# Patient Record
Sex: FEMALE | Race: BLACK OR AFRICAN AMERICAN | NOT HISPANIC OR LATINO | Employment: OTHER | URBAN - METROPOLITAN AREA
[De-identification: names, ages, dates, MRNs, and addresses within clinical notes are randomized per-mention and may not be internally consistent; named-entity substitution may affect disease eponyms.]

---

## 2023-03-30 PROBLEM — R00.0 TACHYCARDIA: Status: ACTIVE | Noted: 2023-03-30

## 2023-03-30 PROBLEM — R82.71 ASB (ASYMPTOMATIC BACTERIURIA): Status: ACTIVE | Noted: 2023-03-30

## 2023-03-30 PROBLEM — I34.0 MITRAL REGURGITATION: Status: ACTIVE | Noted: 2023-03-30

## 2023-03-30 PROBLEM — R11.2 NAUSEA WITH VOMITING: Status: ACTIVE | Noted: 2023-03-30

## 2023-03-30 PROBLEM — I42.9 CARDIOMYOPATHY (MULTI): Status: ACTIVE | Noted: 2023-03-30

## 2023-03-30 PROBLEM — I21.4 NON-ST ELEVATION MYOCARDIAL INFARCTION (NSTEMI) IN RECOVERY PHASE (MULTI): Status: ACTIVE | Noted: 2023-03-30

## 2023-03-30 PROBLEM — E04.2 MULTINODULAR NON-TOXIC GOITER: Status: ACTIVE | Noted: 2023-03-30

## 2023-03-30 PROBLEM — R35.0 FREQUENCY OF URINATION: Status: ACTIVE | Noted: 2023-03-30

## 2023-03-30 PROBLEM — N39.0 UTI (URINARY TRACT INFECTION): Status: ACTIVE | Noted: 2023-03-30

## 2023-03-30 PROBLEM — M79.89 LEG SWELLING: Status: ACTIVE | Noted: 2023-03-30

## 2023-03-30 PROBLEM — R29.898 RIGHT ARM WEAKNESS: Status: ACTIVE | Noted: 2023-03-30

## 2023-03-30 PROBLEM — M35.9 CONNECTIVE TISSUE DISORDER (MULTI): Status: ACTIVE | Noted: 2023-03-30

## 2023-03-30 PROBLEM — G43.909 MIGRAINES: Status: ACTIVE | Noted: 2023-03-30

## 2023-03-30 PROBLEM — J40 BRONCHITIS: Status: ACTIVE | Noted: 2023-03-30

## 2023-03-30 PROBLEM — D72.829 LEUKOCYTOSIS: Status: ACTIVE | Noted: 2023-03-30

## 2023-03-30 PROBLEM — I31.39 PERICARDIAL EFFUSION (HHS-HCC): Status: ACTIVE | Noted: 2023-03-30

## 2023-03-30 PROBLEM — K21.9 GERD (GASTROESOPHAGEAL REFLUX DISEASE): Status: ACTIVE | Noted: 2023-03-30

## 2023-03-30 PROBLEM — M50.90 CERVICAL DISC DISEASE: Status: ACTIVE | Noted: 2023-03-30

## 2023-03-30 PROBLEM — I72.2 RENAL ARTERY ANEURYSM (CMS-HCC): Status: ACTIVE | Noted: 2023-03-30

## 2023-03-30 PROBLEM — R06.02 SHORTNESS OF BREATH: Status: ACTIVE | Noted: 2023-03-30

## 2023-03-30 PROBLEM — Z98.890 STATUS POST OPERATION INVOLVING AORTIC VALVE: Status: ACTIVE | Noted: 2023-03-30

## 2023-03-30 PROBLEM — R27.0 ATAXIA: Status: ACTIVE | Noted: 2023-03-30

## 2023-03-30 PROBLEM — H25.13 AGE-RELATED NUCLEAR CATARACT OF BOTH EYES: Status: ACTIVE | Noted: 2023-03-30

## 2023-03-30 PROBLEM — J45.909 REACTIVE AIRWAY DISEASE (HHS-HCC): Status: ACTIVE | Noted: 2023-03-30

## 2023-03-30 PROBLEM — R01.1 HEART MURMUR: Status: ACTIVE | Noted: 2023-03-30

## 2023-03-30 PROBLEM — Z95.810 ICD (IMPLANTABLE CARDIOVERTER-DEFIBRILLATOR) IN PLACE: Status: ACTIVE | Noted: 2023-03-30

## 2023-03-30 PROBLEM — I25.42 SPONTANEOUS DISSECTION OF CORONARY ARTERY: Status: ACTIVE | Noted: 2023-03-30

## 2023-03-30 PROBLEM — M25.562 LEFT KNEE PAIN: Status: ACTIVE | Noted: 2023-03-30

## 2023-03-30 PROBLEM — R42 LIGHTHEADEDNESS: Status: ACTIVE | Noted: 2023-03-30

## 2023-03-30 PROBLEM — M17.9 ARTHRITIS OF KNEE, DEGENERATIVE: Status: ACTIVE | Noted: 2023-03-30

## 2023-03-30 PROBLEM — D64.9 ANEMIA: Status: ACTIVE | Noted: 2023-03-30

## 2023-03-30 PROBLEM — E04.1 THYROID NODULE: Status: ACTIVE | Noted: 2023-03-30

## 2023-03-30 PROBLEM — M17.11 PRIMARY OSTEOARTHRITIS OF RIGHT KNEE: Status: ACTIVE | Noted: 2023-03-30

## 2023-03-30 PROBLEM — M54.12 CERVICAL RADICULOPATHY: Status: ACTIVE | Noted: 2023-03-30

## 2023-03-30 PROBLEM — I49.3 FREQUENT PVCS: Status: ACTIVE | Noted: 2023-03-30

## 2023-03-30 PROBLEM — G43.109 OCULAR MIGRAINE: Status: ACTIVE | Noted: 2023-03-30

## 2023-03-30 PROBLEM — M17.0 DEGENERATIVE ARTHRITIS OF KNEE, BILATERAL: Status: ACTIVE | Noted: 2023-03-30

## 2023-03-30 PROBLEM — T17.910A ASPIRATION OF VOMIT: Status: ACTIVE | Noted: 2023-03-30

## 2023-03-30 PROBLEM — H43.813 PVD (POSTERIOR VITREOUS DETACHMENT), BOTH EYES: Status: ACTIVE | Noted: 2023-03-30

## 2023-03-30 PROBLEM — I48.92 ATRIAL FLUTTER (MULTI): Status: ACTIVE | Noted: 2023-03-30

## 2023-03-30 PROBLEM — R07.9 CHEST PAIN: Status: ACTIVE | Noted: 2023-03-30

## 2023-03-30 PROBLEM — M54.2 NECK PAIN: Status: ACTIVE | Noted: 2023-03-30

## 2023-03-30 PROBLEM — M54.32 SCIATICA OF LEFT SIDE: Status: ACTIVE | Noted: 2023-03-30

## 2023-03-30 PROBLEM — R09.02 HYPOXIA: Status: ACTIVE | Noted: 2023-03-30

## 2023-03-30 PROBLEM — R10.9 ABDOMINAL PAIN: Status: ACTIVE | Noted: 2023-03-30

## 2023-03-30 PROBLEM — R31.9 BLOOD IN URINE: Status: ACTIVE | Noted: 2023-03-30

## 2023-03-30 PROBLEM — M17.11 ARTHRITIS OF KNEE, RIGHT: Status: ACTIVE | Noted: 2023-03-30

## 2023-03-30 PROBLEM — Z04.9 CONDITION NOT FOUND: Status: ACTIVE | Noted: 2023-03-30

## 2023-03-30 PROBLEM — Z95.4 S/P TVR (TRICUSPID VALVE REPLACEMENT): Status: ACTIVE | Noted: 2023-03-30

## 2023-03-30 PROBLEM — E78.5 DYSLIPIDEMIA: Status: ACTIVE | Noted: 2023-03-30

## 2023-03-30 PROBLEM — I07.1 TRICUSPID REGURGITATION: Status: ACTIVE | Noted: 2023-03-30

## 2023-03-30 PROBLEM — Z86.79 STATUS POST OPERATION INVOLVING AORTIC VALVE: Status: ACTIVE | Noted: 2023-03-30

## 2023-03-30 PROBLEM — R29.898 WEAKNESS OF LEFT LOWER EXTREMITY: Status: ACTIVE | Noted: 2023-03-30

## 2023-03-30 PROBLEM — E66.9 OBESITY: Status: ACTIVE | Noted: 2023-03-30

## 2023-03-30 PROBLEM — M17.12 PRIMARY OSTEOARTHRITIS OF LEFT KNEE: Status: ACTIVE | Noted: 2023-03-30

## 2023-03-30 PROBLEM — R00.2 PALPITATIONS: Status: ACTIVE | Noted: 2023-03-30

## 2023-03-30 PROBLEM — H43.399 FLOATERS: Status: ACTIVE | Noted: 2023-03-30

## 2023-03-30 PROBLEM — E04.1 THYROID CYST: Status: ACTIVE | Noted: 2023-03-30

## 2023-03-30 PROBLEM — Z98.890 H/O MITRAL VALVE REPAIR: Status: ACTIVE | Noted: 2023-03-30

## 2023-03-30 PROBLEM — R07.9 CARDIAC CHEST PAIN: Status: ACTIVE | Noted: 2023-03-30

## 2023-03-30 PROBLEM — I77.73: Status: ACTIVE | Noted: 2023-03-30

## 2023-03-30 PROBLEM — I50.9 CHF (CONGESTIVE HEART FAILURE) (MULTI): Status: ACTIVE | Noted: 2023-03-30

## 2023-03-30 PROBLEM — I72.8 SPLENIC ARTERY ANEURYSM (CMS-HCC): Status: ACTIVE | Noted: 2023-03-30

## 2023-03-30 PROBLEM — I10 HYPERTENSION: Status: ACTIVE | Noted: 2023-03-30

## 2023-03-30 PROBLEM — I47.10 SVT (SUPRAVENTRICULAR TACHYCARDIA) (CMS-HCC): Status: ACTIVE | Noted: 2023-03-30

## 2023-03-30 PROBLEM — R06.09 DOE (DYSPNEA ON EXERTION): Status: ACTIVE | Noted: 2023-03-30

## 2023-03-30 RX ORDER — ASPIRIN 81 MG/1
1 TABLET ORAL DAILY
COMMUNITY

## 2023-03-30 RX ORDER — ATORVASTATIN CALCIUM 80 MG/1
1 TABLET, FILM COATED ORAL NIGHTLY
COMMUNITY
End: 2024-05-08

## 2023-03-30 RX ORDER — ACETAMINOPHEN 325 MG/1
1-2 TABLET ORAL EVERY 4 HOURS PRN
COMMUNITY
End: 2023-10-10 | Stop reason: SDUPTHER

## 2023-03-30 RX ORDER — SPIRONOLACTONE 25 MG/1
25 TABLET ORAL DAILY
COMMUNITY

## 2023-03-30 RX ORDER — DAPAGLIFLOZIN 10 MG/1
1 TABLET, FILM COATED ORAL DAILY
COMMUNITY

## 2023-03-30 RX ORDER — VIT C/E/ZN/COPPR/LUTEIN/ZEAXAN 250MG-90MG
CAPSULE ORAL DAILY
COMMUNITY

## 2023-03-30 RX ORDER — SACUBITRIL AND VALSARTAN 97; 103 MG/1; MG/1
1 TABLET, FILM COATED ORAL 2 TIMES DAILY
COMMUNITY
End: 2024-05-30

## 2023-03-30 RX ORDER — ASCORBIC ACID 500 MG
500 TABLET ORAL DAILY
COMMUNITY
End: 2023-10-19 | Stop reason: ALTCHOICE

## 2023-03-30 RX ORDER — OMEPRAZOLE 20 MG/1
20 CAPSULE, DELAYED RELEASE ORAL DAILY
COMMUNITY
End: 2023-10-10

## 2023-03-30 RX ORDER — FUROSEMIDE 20 MG/1
1 TABLET ORAL DAILY
COMMUNITY
End: 2024-05-08

## 2023-03-30 RX ORDER — METOPROLOL SUCCINATE 100 MG/1
1 TABLET, EXTENDED RELEASE ORAL DAILY
COMMUNITY
End: 2023-11-15 | Stop reason: SDUPTHER

## 2023-03-30 RX ORDER — PHENOL 1.4 %
1 AEROSOL, SPRAY (ML) MUCOUS MEMBRANE DAILY
COMMUNITY

## 2023-03-31 ENCOUNTER — OFFICE VISIT (OUTPATIENT)
Dept: PRIMARY CARE | Facility: CLINIC | Age: 68
End: 2023-03-31
Payer: COMMERCIAL

## 2023-03-31 VITALS
WEIGHT: 213 LBS | RESPIRATION RATE: 20 BRPM | BODY MASS INDEX: 32.39 KG/M2 | SYSTOLIC BLOOD PRESSURE: 103 MMHG | DIASTOLIC BLOOD PRESSURE: 68 MMHG | HEART RATE: 58 BPM

## 2023-03-31 DIAGNOSIS — Z98.890 H/O MITRAL VALVE REPAIR: ICD-10-CM

## 2023-03-31 DIAGNOSIS — I48.92 ATRIAL FLUTTER, UNSPECIFIED TYPE (MULTI): ICD-10-CM

## 2023-03-31 DIAGNOSIS — I72.8 SPLENIC ARTERY ANEURYSM (CMS-HCC): ICD-10-CM

## 2023-03-31 DIAGNOSIS — I50.22 CHRONIC SYSTOLIC CONGESTIVE HEART FAILURE (MULTI): ICD-10-CM

## 2023-03-31 DIAGNOSIS — I42.9 CARDIOMYOPATHY, UNSPECIFIED TYPE (MULTI): ICD-10-CM

## 2023-03-31 DIAGNOSIS — I25.42 SPONTANEOUS DISSECTION OF CORONARY ARTERY: ICD-10-CM

## 2023-03-31 DIAGNOSIS — Z95.4 S/P TVR (TRICUSPID VALVE REPLACEMENT): ICD-10-CM

## 2023-03-31 DIAGNOSIS — I21.4 NON-ST ELEVATION MYOCARDIAL INFARCTION (NSTEMI) IN RECOVERY PHASE (MULTI): ICD-10-CM

## 2023-03-31 DIAGNOSIS — R91.8 RIGHT LOWER LOBE LUNG MASS: Primary | ICD-10-CM

## 2023-03-31 PROCEDURE — 3078F DIAST BP <80 MM HG: CPT | Performed by: INTERNAL MEDICINE

## 2023-03-31 PROCEDURE — 3074F SYST BP LT 130 MM HG: CPT | Performed by: INTERNAL MEDICINE

## 2023-03-31 PROCEDURE — 1159F MED LIST DOCD IN RCRD: CPT | Performed by: INTERNAL MEDICINE

## 2023-03-31 PROCEDURE — 99215 OFFICE O/P EST HI 40 MIN: CPT | Performed by: INTERNAL MEDICINE

## 2023-03-31 NOTE — PROGRESS NOTES
Subjective   Patient ID: Ana Cavanaugh is a 67 y.o. female who presents for Follow-up (Patient would like to follow-up on recent CT scan of the lungs. ).    HPI     Patient with hypertension atrial flutter status post MVR status post TVR history of NSTEMI with spontaneous coronary artery dissection cardiomyopathy with chronic systolic heart failure status post AICD and pacemaker several weeks ago splenic artery aneurysm.  Patient saw her vascular surgeon several weeks ago and had CT angio of the abdomen pelvis disclosing stable splenic artery aneurysms.  The scan showed incidental finding of a new 2.3 cm right lower lobe lung nodule that was spiculated and at high risk for potential lung malignancy.  Patient overall feels fine she does have what amounts to an ACE inhibitor cough as she is on Entresto with tickle in her throat and a dry cough.  Basically a non-smoker, smoked a little bit socially in her 20s but nothing consistent.  No family of lung cancer.  No weight loss.  No chest pain.  Goes on a treadmill 40 minutes 3 days a week without issues her appetite is good and generally feels fine.  Worked outdoors all of her life as a  so no occupational exposures either.  Symptoms and conditions new onset moderate to high severity over the past 2 weeks.    Review of Systems   Constitutional: Negative.    HENT: Negative.     Eyes: Negative.    Respiratory: Negative.          See hpi   Cardiovascular: Negative.         See hpi   Gastrointestinal: Negative.    Genitourinary: Negative.    Musculoskeletal: Negative.    Skin: Negative.    Neurological: Negative.    Psychiatric/Behavioral: Negative.         Objective   /68 (Patient Position: Sitting)   Pulse 58   Resp 20   Wt 96.6 kg (213 lb)   BMI 32.39 kg/m²     Physical Exam  Constitutional:       Appearance: Normal appearance.   HENT:      Head: Normocephalic.      Nose: Nose normal.   Cardiovascular:      Rate and Rhythm: Normal rate and regular  rhythm.      Pulses: Normal pulses.      Heart sounds: Murmur heard.   Pulmonary:      Effort: Pulmonary effort is normal.      Breath sounds: Normal breath sounds.   Abdominal:      Palpations: Abdomen is soft.   Musculoskeletal:         General: Normal range of motion.      Cervical back: Normal range of motion.      Right lower leg: No edema.      Left lower leg: No edema.   Skin:     General: Skin is warm and dry.   Neurological:      General: No focal deficit present.      Mental Status: She is alert and oriented to person, place, and time. Mental status is at baseline.   Psychiatric:         Mood and Affect: Mood normal.         Behavior: Behavior normal.         Thought Content: Thought content normal.         Judgment: Judgment normal.         Assessment/Plan   Diagnoses and all orders for this visit:  Right lower lobe lung mass  -     NM PET CT lung SPN; Future  -     CT chest wo IV contrast; Future  Cardiomyopathy, unspecified type (CMS/HCC)  Atrial flutter, unspecified type (CMS/HCC)  Non-ST elevation myocardial infarction (NSTEMI) in recovery phase (CMS/HCC)  S/P TVR (tricuspid valve replacement)  H/O mitral valve repair  Chronic systolic congestive heart failure (CMS/HCC)  Spontaneous dissection of coronary artery  Splenic artery aneurysm (CMS/HCC)       Patient with hypertension atrial flutter status post MVR status post TVR history of NSTEMI with spontaneous coronary artery dissection cardiomyopathy with chronic systolic heart failure status post AICD and pacemaker several weeks ago splenic artery aneurysm.  Patient saw her vascular surgeon several weeks ago and had CT angio of the abdomen pelvis disclosing stable splenic artery aneurysms.  The scan showed incidental finding of a new 2.3 cm right lower lobe lung nodule that was spiculated and at high risk for potential lung malignancy.  We will evaluate the right lower lobe lung mass with a dedicated CT of the chest to fully look at that area as this  was seen incidentally on a CT angio of the abdomen and pelvis initially.  I would like to see the mediastinum and hilum.  We will order a PET scan for a solitary pulmonary nodule.  Depending upon that work-up we will decide if we send her to thoracic surgery for biopsy via VATS/excision but will look the work-up play out first.  DDx discussed including inflammatory or infectious process.  Given absence of family history of lung cancer, absence of smoking history, absence of occupational exposures, this area may not necessarily be a pulmonary malignancy but will of course work-up.  Further input pending scan results.  Continue care with cardiology and vascular surgery.  Likely ACE inhibitor cough from Entresto but she will continue.  Splenic artery aneurysm stable.  AICD stable without any firings.    This note dictated with Dragon software, not proofread for errors or punctuation.

## 2023-04-01 ASSESSMENT — ENCOUNTER SYMPTOMS
CONSTITUTIONAL NEGATIVE: 1
GASTROINTESTINAL NEGATIVE: 1
EYES NEGATIVE: 1
CARDIOVASCULAR NEGATIVE: 1
NEUROLOGICAL NEGATIVE: 1
MUSCULOSKELETAL NEGATIVE: 1
RESPIRATORY NEGATIVE: 1
PSYCHIATRIC NEGATIVE: 1

## 2023-04-05 ENCOUNTER — TELEPHONE (OUTPATIENT)
Dept: PRIMARY CARE | Facility: CLINIC | Age: 68
End: 2023-04-05
Payer: MEDICARE

## 2023-04-05 NOTE — TELEPHONE ENCOUNTER
Shannon from [facility] called [phone] Patients PET Scan was denied by insurance. Do you want to do a peer to peer.  If so, please have the office set this up

## 2023-04-08 DIAGNOSIS — R91.8 RIGHT LOWER LOBE LUNG MASS: Primary | ICD-10-CM

## 2023-04-10 ENCOUNTER — TELEPHONE (OUTPATIENT)
Dept: PRIMARY CARE | Facility: CLINIC | Age: 68
End: 2023-04-10
Payer: MEDICARE

## 2023-04-10 NOTE — TELEPHONE ENCOUNTER
Pt called and stated that she is having knee replacement surgery 5/3 Pt would like to know if she should push date back or what Pt goes for pre testing on the 18 of this month. What do you suggest she do please advise

## 2023-05-03 LAB
ANION GAP IN SER/PLAS: 11 MMOL/L (ref 10–20)
CALCIUM (MG/DL) IN SER/PLAS: 9.8 MG/DL (ref 8.6–10.3)
CARBON DIOXIDE, TOTAL (MMOL/L) IN SER/PLAS: 29 MMOL/L (ref 21–32)
CHLORIDE (MMOL/L) IN SER/PLAS: 103 MMOL/L (ref 98–107)
CREATININE (MG/DL) IN SER/PLAS: 1.01 MG/DL (ref 0.5–1.05)
ERYTHROCYTE DISTRIBUTION WIDTH (RATIO) BY AUTOMATED COUNT: 13.7 % (ref 11.5–14.5)
ERYTHROCYTE MEAN CORPUSCULAR HEMOGLOBIN CONCENTRATION (G/DL) BY AUTOMATED: 31.4 G/DL (ref 32–36)
ERYTHROCYTE MEAN CORPUSCULAR VOLUME (FL) BY AUTOMATED COUNT: 97 FL (ref 80–100)
ERYTHROCYTES (10*6/UL) IN BLOOD BY AUTOMATED COUNT: 4.11 X10E12/L (ref 4–5.2)
GFR FEMALE: 62 ML/MIN/1.73M2
GLUCOSE (MG/DL) IN SER/PLAS: 88 MG/DL (ref 74–99)
HEMATOCRIT (%) IN BLOOD BY AUTOMATED COUNT: 39.8 % (ref 36–46)
HEMOGLOBIN (G/DL) IN BLOOD: 12.5 G/DL (ref 12–16)
LEUKOCYTES (10*3/UL) IN BLOOD BY AUTOMATED COUNT: 6.2 X10E9/L (ref 4.4–11.3)
NRBC (PER 100 WBCS) BY AUTOMATED COUNT: 0 /100 WBC (ref 0–0)
PLATELETS (10*3/UL) IN BLOOD AUTOMATED COUNT: 193 X10E9/L (ref 150–450)
POTASSIUM (MMOL/L) IN SER/PLAS: 4.2 MMOL/L (ref 3.5–5.3)
SODIUM (MMOL/L) IN SER/PLAS: 139 MMOL/L (ref 136–145)
UREA NITROGEN (MG/DL) IN SER/PLAS: 22 MG/DL (ref 6–23)

## 2023-05-04 LAB
ACTIVATED PARTIAL THROMBOPLASTIN TIME IN PPP BY COAGULATION ASSAY: 32 SEC (ref 26–39)
INR IN PPP BY COAGULATION ASSAY: 1.1 (ref 0.9–1.1)
PROTHROMBIN TIME (PT) IN PPP BY COAGULATION ASSAY: 12.4 SEC (ref 9.8–13.4)

## 2023-05-25 ENCOUNTER — HOSPITAL ENCOUNTER (OUTPATIENT)
Dept: DATA CONVERSION | Facility: HOSPITAL | Age: 68
End: 2023-05-25
Attending: THORACIC SURGERY (CARDIOTHORACIC VASCULAR SURGERY) | Admitting: THORACIC SURGERY (CARDIOTHORACIC VASCULAR SURGERY)
Payer: MEDICARE

## 2023-05-25 DIAGNOSIS — Z53.8 PROCEDURE AND TREATMENT NOT CARRIED OUT FOR OTHER REASONS: ICD-10-CM

## 2023-05-25 DIAGNOSIS — R91.1 SOLITARY PULMONARY NODULE: ICD-10-CM

## 2023-08-15 ENCOUNTER — HOSPITAL ENCOUNTER (OUTPATIENT)
Dept: DATA CONVERSION | Facility: HOSPITAL | Age: 68
End: 2023-08-16
Attending: ORTHOPAEDIC SURGERY | Admitting: ORTHOPAEDIC SURGERY
Payer: MEDICARE

## 2023-08-15 DIAGNOSIS — M17.11 UNILATERAL PRIMARY OSTEOARTHRITIS, RIGHT KNEE: ICD-10-CM

## 2023-08-16 ENCOUNTER — HOSPITAL ENCOUNTER (OUTPATIENT)
Dept: DATA CONVERSION | Facility: HOSPITAL | Age: 68
End: 2023-08-16

## 2023-08-16 DIAGNOSIS — M17.11 UNILATERAL PRIMARY OSTEOARTHRITIS, RIGHT KNEE: ICD-10-CM

## 2023-08-16 LAB
ANION GAP IN SER/PLAS: 15 MMOL/L (ref 10–20)
BASOPHILS (10*3/UL) IN BLOOD BY AUTOMATED COUNT: 0.01 X10E9/L (ref 0–0.1)
BASOPHILS/100 LEUKOCYTES IN BLOOD BY AUTOMATED COUNT: 0.1 % (ref 0–2)
CALCIUM (MG/DL) IN SER/PLAS: 7.6 MG/DL (ref 8.6–10.3)
CARBON DIOXIDE, TOTAL (MMOL/L) IN SER/PLAS: 21 MMOL/L (ref 21–32)
CHLORIDE (MMOL/L) IN SER/PLAS: 100 MMOL/L (ref 98–107)
CREATININE (MG/DL) IN SER/PLAS: 1.01 MG/DL (ref 0.5–1.05)
ERYTHROCYTE DISTRIBUTION WIDTH (RATIO) BY AUTOMATED COUNT: 13.5 % (ref 11.5–14.5)
ERYTHROCYTE MEAN CORPUSCULAR HEMOGLOBIN CONCENTRATION (G/DL) BY AUTOMATED: 32.7 G/DL (ref 32–36)
ERYTHROCYTE MEAN CORPUSCULAR VOLUME (FL) BY AUTOMATED COUNT: 94 FL (ref 80–100)
ERYTHROCYTES (10*6/UL) IN BLOOD BY AUTOMATED COUNT: 3.4 X10E12/L (ref 4–5.2)
GFR FEMALE: 62 ML/MIN/1.73M2
GLUCOSE (MG/DL) IN SER/PLAS: 119 MG/DL (ref 74–99)
HEMATOCRIT (%) IN BLOOD BY AUTOMATED COUNT: 32.1 % (ref 36–46)
HEMOGLOBIN (G/DL) IN BLOOD: 10.5 G/DL (ref 12–16)
IMMATURE GRANULOCYTES/100 LEUKOCYTES IN BLOOD BY AUTOMATED COUNT: 0.4 % (ref 0–0.9)
LEUKOCYTES (10*3/UL) IN BLOOD BY AUTOMATED COUNT: 10.8 X10E9/L (ref 4.4–11.3)
LYMPHOCYTES (10*3/UL) IN BLOOD BY AUTOMATED COUNT: 0.97 X10E9/L (ref 1.2–4.8)
LYMPHOCYTES/100 LEUKOCYTES IN BLOOD BY AUTOMATED COUNT: 9 % (ref 13–44)
MONOCYTES (10*3/UL) IN BLOOD BY AUTOMATED COUNT: 0.64 X10E9/L (ref 0.1–1)
MONOCYTES/100 LEUKOCYTES IN BLOOD BY AUTOMATED COUNT: 5.9 % (ref 2–10)
NEUTROPHILS (10*3/UL) IN BLOOD BY AUTOMATED COUNT: 9.16 X10E9/L (ref 1.2–7.7)
NEUTROPHILS/100 LEUKOCYTES IN BLOOD BY AUTOMATED COUNT: 84.6 % (ref 40–80)
PLATELETS (10*3/UL) IN BLOOD AUTOMATED COUNT: 144 X10E9/L (ref 150–450)
POTASSIUM (MMOL/L) IN SER/PLAS: 4.8 MMOL/L (ref 3.5–5.3)
SODIUM (MMOL/L) IN SER/PLAS: 131 MMOL/L (ref 136–145)
UREA NITROGEN (MG/DL) IN SER/PLAS: 18 MG/DL (ref 6–23)

## 2023-08-17 LAB
ANION GAP IN SER/PLAS: NORMAL
BASOPHILS (10*3/UL) IN BLOOD BY AUTOMATED COUNT: NORMAL
BASOPHILS/100 LEUKOCYTES IN BLOOD BY AUTOMATED COUNT: NORMAL
CALCIUM (MG/DL) IN SER/PLAS: NORMAL
CARBON DIOXIDE, TOTAL (MMOL/L) IN SER/PLAS: NORMAL
CHLORIDE (MMOL/L) IN SER/PLAS: NORMAL
CREATININE (MG/DL) IN SER/PLAS: NORMAL
EOSINOPHILS (10*3/UL) IN BLOOD BY AUTOMATED COUNT: NORMAL
EOSINOPHILS/100 LEUKOCYTES IN BLOOD BY AUTOMATED COUNT: NORMAL
ERYTHROCYTE DISTRIBUTION WIDTH (RATIO) BY AUTOMATED COUNT: NORMAL
ERYTHROCYTE MEAN CORPUSCULAR HEMOGLOBIN CONCENTRATION (G/DL) BY AUTOMATED: NORMAL
ERYTHROCYTE MEAN CORPUSCULAR VOLUME (FL) BY AUTOMATED COUNT: NORMAL
ERYTHROCYTES (10*6/UL) IN BLOOD BY AUTOMATED COUNT: NORMAL
GFR FEMALE: NORMAL
GFR MALE: NORMAL
GLUCOSE (MG/DL) IN SER/PLAS: NORMAL
HEMATOCRIT (%) IN BLOOD BY AUTOMATED COUNT: NORMAL
HEMOGLOBIN (G/DL) IN BLOOD: NORMAL
IMMATURE GRANULOCYTES/100 LEUKOCYTES IN BLOOD BY AUTOMATED COUNT: NORMAL
LEUKOCYTES (10*3/UL) IN BLOOD BY AUTOMATED COUNT: NORMAL
LYMPHOCYTES (10*3/UL) IN BLOOD BY AUTOMATED COUNT: NORMAL
LYMPHOCYTES/100 LEUKOCYTES IN BLOOD BY AUTOMATED COUNT: NORMAL
MANUAL DIFFERENTIAL Y/N: NORMAL
MONOCYTES (10*3/UL) IN BLOOD BY AUTOMATED COUNT: NORMAL
MONOCYTES/100 LEUKOCYTES IN BLOOD BY AUTOMATED COUNT: NORMAL
NEUTROPHILS (10*3/UL) IN BLOOD BY AUTOMATED COUNT: NORMAL
NEUTROPHILS/100 LEUKOCYTES IN BLOOD BY AUTOMATED COUNT: NORMAL
NRBC (PER 100 WBCS) BY AUTOMATED COUNT: NORMAL
PLATELETS (10*3/UL) IN BLOOD AUTOMATED COUNT: NORMAL
POTASSIUM (MMOL/L) IN SER/PLAS: NORMAL
SODIUM (MMOL/L) IN SER/PLAS: NORMAL
UREA NITROGEN (MG/DL) IN SER/PLAS: NORMAL

## 2023-09-19 PROBLEM — R91.1 LUNG NODULE: Status: ACTIVE | Noted: 2023-09-19

## 2023-09-19 PROBLEM — I50.31 ACUTE DIASTOLIC CONGESTIVE HEART FAILURE (MULTI): Status: ACTIVE | Noted: 2023-09-19

## 2023-09-19 RX ORDER — PANTOPRAZOLE SODIUM 40 MG/1
40 TABLET, DELAYED RELEASE ORAL DAILY
COMMUNITY
End: 2023-10-10 | Stop reason: ALTCHOICE

## 2023-09-19 RX ORDER — MELOXICAM 15 MG/1
15 TABLET ORAL DAILY
COMMUNITY
Start: 2022-09-26 | End: 2023-10-10 | Stop reason: SDUPTHER

## 2023-09-19 RX ORDER — CHLORHEXIDINE GLUCONATE ORAL RINSE 1.2 MG/ML
SOLUTION DENTAL
COMMUNITY
Start: 2023-08-02 | End: 2023-10-10 | Stop reason: ALTCHOICE

## 2023-09-19 RX ORDER — FERROUS SULFATE 325(65) MG
1 TABLET ORAL
COMMUNITY
End: 2023-10-10 | Stop reason: ALTCHOICE

## 2023-09-19 RX ORDER — OXYCODONE HYDROCHLORIDE 5 MG/1
1 TABLET ORAL EVERY 6 HOURS PRN
COMMUNITY
Start: 2022-10-05 | End: 2023-10-10 | Stop reason: ALTCHOICE

## 2023-09-19 RX ORDER — DOCUSATE SODIUM 100 MG/1
1 CAPSULE, LIQUID FILLED ORAL 2 TIMES DAILY
COMMUNITY
Start: 2022-09-26 | End: 2023-10-10 | Stop reason: SDUPTHER

## 2023-09-19 RX ORDER — DIGOXIN 250 MCG
250 TABLET ORAL DAILY
COMMUNITY
Start: 2011-12-08 | End: 2023-10-10 | Stop reason: ALTCHOICE

## 2023-09-19 RX ORDER — CEPHALEXIN 500 MG/1
1 CAPSULE ORAL EVERY 6 HOURS
COMMUNITY
Start: 2023-03-07 | End: 2023-10-10 | Stop reason: ALTCHOICE

## 2023-09-19 RX ORDER — TRAMADOL HYDROCHLORIDE 50 MG/1
50 TABLET ORAL EVERY 6 HOURS PRN
COMMUNITY
Start: 2022-09-30 | End: 2023-10-10 | Stop reason: SDUPTHER

## 2023-09-29 VITALS
WEIGHT: 222.44 LBS | TEMPERATURE: 97 F | SYSTOLIC BLOOD PRESSURE: 105 MMHG | HEART RATE: 52 BPM | BODY MASS INDEX: 35.75 KG/M2 | HEIGHT: 66 IN | DIASTOLIC BLOOD PRESSURE: 75 MMHG | RESPIRATION RATE: 19 BRPM

## 2023-09-30 NOTE — DISCHARGE SUMMARY
Send Summary:   Discharge Summary Providers:  Provider Role Provider Name   · Attending Anthony Bermudez   · Referring Anthony Bermudez   · Primary Hesham Higgins       Note Recipients: Anthony Bermudez MD       Discharge:    Summary:   Admission Date: .15-Aug-2023 06:42:00   Discharge Date: 16-Aug-2023   Attending Physician at Discharge: Anthony Bermudez   Admission Reason: Right knee OA   Final Discharge Diagnoses: Arthritis of right knee   Procedures: Date: 15-Aug-2023 09:51:00  Procedure Name: 1. Right Knee Total Replacement   Condition at Discharge: Satisfactory   Disposition at Discharge: Home Health Care - New   Vital Signs:        T   P  R  BP   MAP  SpO2   Value  36.6  56  17  95/60   79  95%  Date/Time 8/16 8:27 8/16 8:27 8/16 8:27 8/16 8:27  8/15 15:30 8/16 8:27  Range  (36C - 36.7C )  (50 - 62 )  (13 - 19 )  (95 - 132 )/ (59 - 69 )  (79 - 79 )  (95% - 100% )    Date:            Weight/Scale Type:  Height:   15-Aug-2023 15:02  100.9  kg / standing  167.4  cm  Physical Exam:    PE:  Constitutional: A&Ox3, calm and cooperative, NAD  Cardiovascular: Normal rate and regular rhythm. No murmurs  Respiratory/Thorax: CTAB, No rales, rhonchi, or wheezes. on RA  Gastrointestinal: Abdomen soft, nondistended   Genitourinary: Voiding independently   Musculoskeletal: right knee dressing CDI w/o surrounding erythema or drainage. fires EHL/FHL/TA/TP/EDL/FDL. SILT in SP/DP/T distribution. 2+ DP/PT, <2 CRT. Compartments soft, compressible. HV: 120 cc/24 hours- SS. pulled by this PA w/o complication and  covered with new C/D/I dressing   Extremities: No peripheral edema, contusions, or wounds  Neurological: A&Ox3, No focal deficits   Psychological: Appropriate mood and behavior        Hospital Course:    Patient is now s/p RTKA with Dr. Bermudez on 8/15/23. On the day of surgery, patient was identified in the pre-operative holding area and agreeable to proceed with  surgery. Written consent was obtained.   Please see operative note for further details of this procedure. Patient received 24 hours of xiomara-operative antibiotics. Patient recovered in the PACU before transfer to a regular nursing floor. Patient was started  on oxycodone and tylenol for pain control and anticoagulant for DVT prophylaxis. On the day of discharge, patient was afebrile with stable vital signs. Patient was neurovascularly intact at time of discharge. Patient was discharged with prescription of  anticoagulant for DVT prophylaxis for 4 weeks.      Discharge Information:    and Continuing Care:   Lab Results - Pending:    None  Radiology Results - Pending: None   Discharge Instructions:    Activity:           May shower..            May not drive while taking narcotics.            No pushing, pulling, or lifting objects greater than 10 pounds.            Weight-bearing Instructions: full weight bearing.      Nutrition/Diet:           resume normal diet    Wound Care:           Wound Type:   surgical incision    Additional Orders:           Additional Instructions:   Resume normal diet for you.  Take medications as prescribed..  Activity and full weight bearing as tolerated  Apply NINA hose or ACE bandages to both lower legs x 6 weeks; remove at night.  Ice your extremity per PT instructions.  May shower with waterproof bandage. Home care will remove in 6-7 days.   Wound may be open to air when dry. If there is continued drainage, cover with an abdominal pad and ACE wrap.   Let water run freely over incision when showering, do not scrub. steri strips will fall off in 1-2 weeks. if after 2 weeks they, have not,  gently peel them off.  If the patient has staples in place, homecare to remove in 2 weeks post-op.  Do not soak in bath tub, hot tub, pool, lake, pond for 8 weeks.  No lotions, creams, ointments for 6 weeks to the operative leg.  No driving while on pain medicine or have been cleared by PA or Surgeon.  Do not visit the dentist until 3 months  after the surgery date unless an emergency.  You will need to take an antibiotic previous to any dental procedures.  Call our office and request a prescription for antibiotics for previous to these procedures lifelong.    Call if any drainage after 7 days, increased redness/warmth/swelling at incision site, pain/tenderness of calf, swelling of calf that does not respond to elevation or fever 101.5  Go to ER or call 911 if you are experiencing chest pain or difficulty breathing.    MEDICATION SIDE EFFECTS.  OXYCODONE: constipation, nausea, vomiting, upset stomach, (sleepiness), dizziness, lightheadedness, itching, headache, blurred vision, dry mouth, sweating  TRAMADOL: headache, dizziness, drowsiness, tired feeling; constipation, diarrhea, nausea, vomiting, stomach pain; feeling nervous or anxious, itching, sweating, flushing.      Follow-up with Dr. Bermudez or Ellie Cavanaugh PA-C as scheduled in 6 weeks.  [phone] - Call to Confirm     Pain medicine refills call [phone]    **home going medications sent to pharmacy on file**      Home Care Certification:           Home Care Agency:    [facility] [phone]          Skilled Disciplines Ordered:   PT,  OT    Home Care Services:           Home Care Skilled Service:   Rehab (PT/OT/SP eval and treat)    Discharge Medications: Home Medication   Multiple Vitamins oral tablet - 1 tab(s) orally once a day  atorvastatin 80 mg oral tablet - 1 tab(s) orally once a day  furosemide 20 mg oral tablet - 1 tab(s) orally once a day  metoprolol 100 mg oral tablet, extended release - 1 tab(s) oral once a day  Entresto 97 mg-103 mg oral tablet - 1 tab(s) orally 2 times a day  Farxiga 10 mg oral tablet - 1 tab(s) orally once a day  pantoprazole 40 mg oral delayed release tablet - 1 tab(s) oral once a day  spironolactone 25 mg oral tablet - 1 tab(s) orally once a day  Klor-Con 10 oral tablet, extended release - 1 tab(s) orally once a day  Vitamin C 500 mg oral capsule - 1  cap(s) orally once a day  Vitamin D3 25 mcg (1000 intl units) oral tablet - 1 tab(s) orally once a day  acetaminophen 500 mg oral tablet - 1 tab(s) orally every 6 hours -.Meds to Beds   Aspirin Enteric Coated 81 mg oral delayed release tablet - 1 tab(s) orally 2 times a day -.Meds to Beds   Colace 100 mg oral capsule - 1 cap(s) orally 2 times a day -.Meds to Beds   meloxicam 15 mg oral tablet - 1 tab(s) orally once a day -.Meds to Beds   oxyCODONE 5 mg oral tablet - 1 tab(s) orally every 6 hours -.Meds to Beds  or PRN forn pain   pantoprazole 40 mg oral delayed release tablet - 1 tab(s) orally once a day -.Meds to Beds   traMADol 50 mg oral tablet - 1 tab(s) orally every 6 hours -.Meds to Beds  as needed for pain   Aspir 81 oral delayed release tablet - 1 tab(s) orally once a day  ondansetron 4 mg oral tablet, disintegrating - 1 tab(s) orally every 4 hours   as needed for nausea     .Meds to Beds     PRN Medication     DNR Status:   ·  Code Status Code Status order at time of discharge: Full Code       Electronic Signatures:  Jennifer May (PAC)  (Signed 16-Aug-2023 12:00)   Authored: Send Summary, Summary Content, Ongoing Care,  DNR Status, Note Completion      Last Updated: 16-Aug-2023 12:00 by Jennifer May (PAC)

## 2023-09-30 NOTE — H&P
History of Present Illness:   History Present Illness:  Reason for surgery: R knee OA   HPI:    67F with R knee OA presenting for R TKA.    Allergies:        Allergies:  ·  No Known Allergies :     Home Medication Review:   Home Medications Reviewed: no     Impression/Procedure:   ·  Impression and Planned Procedure: R knee OA w/ plan for R TKA.       ERAS (Enhanced Recovery After Surgery):  ·  ERAS Patient: no       Vital Signs:  Temperature C: 36.1 degrees C   Temperature F: 96.9 degrees F   Heart Rate: 52 beats per minute   Respiratory Rate: 19 breath per minute   Blood Pressure Systolic: 96 mm/Hg   Blood Pressure Diastolic: 60 mm/Hg     Physical Exam by System:    Respiratory/Thorax: Breathing comfortably on room  air w/ symmetric thoracic expansion   Cardiovascular: Extremities warm and well perfused,  capillary refill <2s, no pedal edema     Consent:   COVID-19 Consent:  ·  COVID-19 Risk Consent Surgeon has reviewed key risks related to the risk of vanessa COVID-19 and if they contract COVID-19 what the risks are.     Attestation:   Note Completion:  I am a:  Resident/Fellow   Attending Attestation I saw and evaluated the patient.  I personally obtained the key and critical portions of the history and physical exam or was physically present for key and  critical portions performed by the resident/fellow. I reviewed the resident/fellow?s documentation and discussed the patient with the resident/fellow.  I agree with the resident/fellow?s medical decision making as documented in the note.     I personally evaluated the patient on 15-Aug-2023         Electronic Signatures:  Bayron Gonsalez (Resident))  (Signed 15-Aug-2023 07:27)   Authored: History of Present Illness, Allergies, Home  Medication Review, Impression/Procedure, ERAS, Physical Exam, Consent, Note Completion  Anthony Bermudez)  (Signed 15-Aug-2023 17:08)   Authored: Physical Exam, Note Completion   Co-Signer: History of Present  Illness, Allergies, Home Medication Review, Impression/Procedure, ERAS, Physical Exam, Consent, Note Completion      Last Updated: 15-Aug-2023 17:08 by Anthony Bermudez)

## 2023-10-01 NOTE — OP NOTE
PROCEDURE DETAILS    Preoperative Diagnosis:  Primary localized osteoarthritis of right knee, M17.11    Postoperative Diagnosis:  Primary localized osteoarthritis of right knee, M17.11    Surgeon: Anthony Bermudez  Resident/Fellow/Other Assistant: Ellie Cavanaugh Daniel B    Procedure:  1. Right Knee Total Replacement     Anesthesia: Austni Mcintyre  Estimated Blood Loss: 25  Findings: advanced OA  Specimens(s) Collected: no,     Complications: none  Additional Details: WBAT, ASA  Patient Returned To/Condition: PACU stable        Operative Report:   PREOPERATIVE DIAGNOSIS:  right knee osteoarthritis     POSTOPERATIVE DIAGNOSIS:  right knee osteoarthritis     OPERATION/PROCEDURE:  right total knee arthroplasty     SURGEON:  Anthony Bermudez MD     ASSISTANT(S):  Mao    The patient's surgery was assisted by HOLLY Centeno, due to lack of availability of a qualified resident to assist with the surgery.  Ellie Cavanaugh was present for the essential parts of the procedure.  She acted as first assistant and assisted with  preparing the leg, draping the leg, exposing the knee, retracting and manipulating the leg during surgery, facilitating safe performance of the procedure, and closure of the wound.     ANESTHESIA:  spinal     LOCATION:  [facility]     ESTIMATED BLOOD LOSS AND INTRAVENOUS FLUIDS:  Please see Anesthesia record     COMPONENTS USED:  DePuy Attune knee system  1. 5N femur  2. 4 tibia  3. 38 patella  4. 5mm insert     BRIEF CLINICAL NOTE:  The patient is a 66 yo female with advanced osteoarthritis of  their right knee.  They failed conservative treatment and wished to  proceed with total knee arthroplasty which is indicated at this time.  We discussed the risks, benefits, and alternatives of surgery  including but not limited to infection, damage to vessel or nerve,  bleeding, soft tissue pain, DVT, PE, problems with anesthesia, lack  of range of motion, continued soft tissue pain, need for  further  surgery, etc.  Consent was obtained.  They were taken to the operating  room in order to undergo the procedure.    PRE-OP ROM: 0-100     OPERATIVE REPORT:  The patient was transferred to the operating room table.  Time-out  was performed confirming patient name, medical record number,  surgical site, and adequate and appropriate imaging.  The patient  received appropriate IV antibiotics as well as tranexamic acid.  Once we were prepped and draped,midline skin incision was performed.  Hemostasis was obtained using electrocautery.  Underlying extensor mechanism was easily identified and entered using  a standard medial parapatellar arthrotomy performedsharply.  The infrapatellar and suprapatellar fat pads were debrided.Initial medial release was performed.  The patella was subluxed laterally.  Distal femur was entered using a step drill.  Distal  femoral cut was performed, and the femur was sized and prepared.  The tibia was subluxed forward using a double-prong PCL retractor.  The proximal tibia was cut in neutral mechanical axis using an  extramedullary tibial guide.  We then used the lamina  to clear out the posterior osteophytes and clear the meniscal remnants. We then sized the tibia and prepared it. We cut the patella freehand using a caliper to restore the native patellar  height. We then trialed with multiple polyethylene inserts.  Once I was happy with the position of components and stability of the knee, all trial components were removed.  The  cut bony surfaces were thoroughly irrigated and dried.  We then cemented into place the real components.  The knee was held in extension until all cement was hardened.  All extraneous cement was  removed.  We then closed the extensor mechanism over a drain using interrupted #2 Ethibond as well as interrupted 0 Vicryl.  The subcu was closed with interrupted 2-0 Vicryl.  The skin was closed with  running 3-0 Biosyn followed by Dermabond and  Steri-Strips.  Dry sterile dressing was placed.  The patient was transferred back to the hospital bed without incident or complication.  She will be  weightbearing as tolerated.  They will be on ASA and SCDs for DVT prophylaxis.                        Attestation:   Note Completion:  Attending Attestation I was present for key portions of the procedure and the procedure lasted longer than 5 minutes.         Electronic Signatures:  Anthony Bermudez)  (Signed 15-Aug-2023 09:54)   Authored: Post-Operative Note, Chart Review, Note Completion      Last Updated: 15-Aug-2023 09:54 by Anthony Bermudez)

## 2023-10-03 ENCOUNTER — TREATMENT (OUTPATIENT)
Dept: PHYSICAL THERAPY | Facility: CLINIC | Age: 68
End: 2023-10-03
Payer: MEDICARE

## 2023-10-03 DIAGNOSIS — M25.661 STIFFNESS OF RIGHT KNEE, NOT ELSEWHERE CLASSIFIED: ICD-10-CM

## 2023-10-03 DIAGNOSIS — M25.561 PAIN IN JOINT OF RIGHT KNEE: ICD-10-CM

## 2023-10-03 DIAGNOSIS — M25.562 ACUTE PAIN OF LEFT KNEE: ICD-10-CM

## 2023-10-03 DIAGNOSIS — M25.562 LEFT KNEE PAIN: Primary | ICD-10-CM

## 2023-10-03 DIAGNOSIS — M25.461 EFFUSION, RIGHT KNEE: ICD-10-CM

## 2023-10-03 PROCEDURE — 97110 THERAPEUTIC EXERCISES: CPT | Mod: GP,CQ

## 2023-10-03 ASSESSMENT — PAIN SCALES - GENERAL: PAINLEVEL_OUTOF10: 2

## 2023-10-03 ASSESSMENT — PAIN DESCRIPTION - DESCRIPTORS: DESCRIPTORS: TIGHTNESS

## 2023-10-03 ASSESSMENT — PAIN - FUNCTIONAL ASSESSMENT: PAIN_FUNCTIONAL_ASSESSMENT: 0-10

## 2023-10-03 NOTE — PROGRESS NOTES
"Physical Therapy Treatment    Patient Name: Ana Cavanaugh  MRN: [id number]  Today's Date: 10/3/2023  Time Calculation  Start Time: 1000  Stop Time: 1043  Time Calculation (min): 43 min      Assessment:   Patient is making excellent progress.  She has good ROM and will benefit from strengthening for full functional mobility without limitations     Plan:  Cont with strengthening as tolerated  OP PT Plan  PT Plan: Skilled PT  Certification Period Start Date: 09/19/23  Certification Period End Date: 11/19/23  Number of Treatments Authorized: 2/MN    Current Problem  1. Left knee pain        2. Stiffness of right knee, not elsewhere classified        3. Effusion, right knee        4. Pain in joint of right knee        5. Acute pain of left knee            Subjective     Patient reports that her knee is doing pretty good.  She just has some tightness   Pain  Pain Assessment: 0-10  Pain Score: 2  Pain Location: Knee  Pain Descriptors: Tightness    Objective   R knee ROM 0-126*          Treatments:  Therapeutic Exercise  Therapeutic Exercise Activity 1: rec bike x 6 min Man/L2  Therapeutic Exercise Activity 2: slantbd 2 x 30\"  Therapeutic Exercise Activity 3: heelslides with strap x 15  Therapeutic Exercise Activity 4: LAQ 2#  10\" x 15 B  Therapeutic Exercise Activity 5: HS curl  GTB 2 x 10 B  Therapeutic Exercise Activity 6: 8\" step ups forward 2 x 10  Therapeutic Exercise Activity 7: 8\" step ups lateral x 10 B      OP EDUCATION:       Goals:  Encounter Problems       Encounter Problems (Active)       PT Problem       PT Goal 1       Start:  10/03/23       1. Pt will increase LEFS with 65/80 or better in order to demo an increase in L knee function  2. Pt will increase R knee ROM to 0-130 in order to demo an increase in knee functional mobility   3. Pt will demo 4+/5 or all hip/knee MMT in order to demo an increase in LE strength   4. Pt will be able to ascend and descend 10 steps with reciprocal gait pattern and proper knee " control in order to demo and increase in functional mobility  5. Pt will demo compliance and independence with HEP   6. Pt will be able to walk for 10 mins with step through gait pattern and non antalgic gait pattern with no AD

## 2023-10-09 DIAGNOSIS — K21.9 GASTRO-ESOPHAGEAL REFLUX DISEASE WITHOUT ESOPHAGITIS: Primary | ICD-10-CM

## 2023-10-09 NOTE — PROGRESS NOTES
"Physical Therapy Treatment    Patient Name: Ana Cavanaugh  MRN: [id number]  Today's Date: 10/10/2023  Time Calculation  Start Time: 1044  Stop Time: 1130  Time Calculation (min): 46 min      Assessment:     She has good ROM and will benefit from strengthening for full functional mobility without limitations     Plan:  Cont with strengthening as tolerated  OP PT Plan  PT Plan: Skilled PT  Certification Period Start Date: 09/19/23  Certification Period End Date: 11/19/23  Number of Treatments Authorized: 3/MN       Current Problem  1. Pain in joint of right knee        2. Stiffness of right knee, not elsewhere classified        3. Effusion, right knee            PRECAUTIONS:  Pacemaker placed 3/2023 - NO LIFTING OVER 5#    Subjective     Patient reports that knee is coming along     Pain    1/10  No pain but itches and incision is a little tender but doing well.     Objective   R knee ROM 0-126*          Treatments:  Therapeutic Exercise  rec bike x 6 min Man/L2  slantbd 2 x 30\"  Mini squats at // bars  2 x 10   Sit to stand chair with pad 2 x 10   LAQ 3#  10\" x 15 B  HS curl  GTB 2 x 10 B   8\" step ups forward 2 x 10  Standing hamstring stretch at step  Gentle distraction seated  STM to distal quad        OP EDUCATION:       Goals:    Start:  10/03/23      1. Pt will increase LEFS with 65/80 or better in order to demo an increase in L knee function  2. Pt will increase R knee ROM to 0-130 in order to demo an increase in knee functional mobility   3. Pt will demo 4+/5 or all hip/knee MMT in order to demo an increase in LE strength   4. Pt will be able to ascend and descend 10 steps with reciprocal gait pattern and proper knee control in order to demo and increase in functional mobility  5. Pt will demo compliance and independence with HEP   6. Pt will be able to walk for 10 mins with step through gait pattern and non antalgic gait pattern with no AD        "

## 2023-10-10 ENCOUNTER — TREATMENT (OUTPATIENT)
Dept: PHYSICAL THERAPY | Facility: CLINIC | Age: 68
End: 2023-10-10
Payer: MEDICARE

## 2023-10-10 DIAGNOSIS — M25.461 EFFUSION, RIGHT KNEE: ICD-10-CM

## 2023-10-10 DIAGNOSIS — M25.561 PAIN IN JOINT OF RIGHT KNEE: Primary | ICD-10-CM

## 2023-10-10 DIAGNOSIS — M25.661 STIFFNESS OF RIGHT KNEE, NOT ELSEWHERE CLASSIFIED: ICD-10-CM

## 2023-10-10 PROCEDURE — 97110 THERAPEUTIC EXERCISES: CPT | Mod: GP,CQ

## 2023-10-10 RX ORDER — OMEPRAZOLE 20 MG/1
20 CAPSULE, DELAYED RELEASE ORAL DAILY
Qty: 90 CAPSULE | Refills: 1 | Status: SHIPPED | OUTPATIENT
Start: 2023-10-10 | End: 2024-02-12

## 2023-10-10 RX ORDER — ACETAMINOPHEN 500 MG
2 TABLET ORAL EVERY 6 HOURS PRN
COMMUNITY
Start: 2023-08-16

## 2023-10-16 NOTE — PROGRESS NOTES
"Physical Therapy Treatment    Patient Name: Ana Cavanaugh  MRN: [id number]  Today's Date: 10/10/2023  Time Calculation  Start Time: 1000  Stop Time: 1045  Time Calculation (min): 45 min      Assessment:   Patient is making good progress    Plan:  Cont with strengthening as tolerated  OP PT Plan  PT Plan: Skilled PT  Certification Period Start Date: 09/19/23  Certification Period End Date: 11/19/23  Number of Treatments Authorized: 4/MN       Current Problem  1. Pain in joint of right knee        2. Stiffness of right knee, not elsewhere classified        3. Effusion, right knee            PRECAUTIONS:  Pacemaker placed 3/2023 - NO LIFTING OVER 5#    Subjective     Patient reports that knee is coming along . Every day its getting better    Pain    0/10     Objective   R knee ROM 0-126*          Treatments:  Therapeutic Exercise  rec bike x 6 min Man/L2  slantbd 2 x 30\"  Leg press 40# 2 x 10   Sit to stand chair with pad 2 x 10   LAQ 4#  10\" x 15 B  HS curl  GTB 2 x 10 B   6\" step ups forward with high knee x 20   Standing hamstring stretch at step          OP EDUCATION:  [id number]     Goals:  Active       PT Problem       PT Goal 1       Start:  10/03/23       1. Pt will increase LEFS with 65/80 or better in order to demo an increase in L knee function  2. Pt will increase R knee ROM to 0-130 in order to demo an increase in knee functional mobility   3. Pt will demo 4+/5 or all hip/knee MMT in order to demo an increase in LE strength   4. Pt will be able to ascend and descend 10 steps with reciprocal gait pattern and proper knee control in order to demo and increase in functional mobility  5. Pt will demo compliance and independence with HEP   6. Pt will be able to walk for 10 mins with step through gait pattern and non antalgic gait pattern with no AD                "

## 2023-10-17 ENCOUNTER — TREATMENT (OUTPATIENT)
Dept: PHYSICAL THERAPY | Facility: CLINIC | Age: 68
End: 2023-10-17
Payer: MEDICARE

## 2023-10-17 DIAGNOSIS — M25.661 STIFFNESS OF RIGHT KNEE, NOT ELSEWHERE CLASSIFIED: ICD-10-CM

## 2023-10-17 DIAGNOSIS — M25.461 EFFUSION, RIGHT KNEE: ICD-10-CM

## 2023-10-17 DIAGNOSIS — M25.561 PAIN IN JOINT OF RIGHT KNEE: Primary | ICD-10-CM

## 2023-10-17 PROCEDURE — 97110 THERAPEUTIC EXERCISES: CPT | Mod: GP,CQ

## 2023-10-19 ENCOUNTER — OFFICE VISIT (OUTPATIENT)
Dept: ENDOCRINOLOGY | Facility: CLINIC | Age: 68
End: 2023-10-19
Payer: MEDICARE

## 2023-10-19 ENCOUNTER — LAB (OUTPATIENT)
Dept: LAB | Facility: LAB | Age: 68
End: 2023-10-19
Payer: MEDICARE

## 2023-10-19 VITALS
WEIGHT: 224.6 LBS | SYSTOLIC BLOOD PRESSURE: 120 MMHG | DIASTOLIC BLOOD PRESSURE: 70 MMHG | RESPIRATION RATE: 16 BRPM | HEART RATE: 80 BPM | BODY MASS INDEX: 36.1 KG/M2 | HEIGHT: 66 IN

## 2023-10-19 DIAGNOSIS — E04.2 MULTINODULAR NON-TOXIC GOITER: ICD-10-CM

## 2023-10-19 DIAGNOSIS — E04.2 MULTINODULAR NON-TOXIC GOITER: Primary | ICD-10-CM

## 2023-10-19 LAB — TSH SERPL-ACNC: 1.82 MIU/L (ref 0.44–3.98)

## 2023-10-19 PROCEDURE — 1160F RVW MEDS BY RX/DR IN RCRD: CPT | Performed by: INTERNAL MEDICINE

## 2023-10-19 PROCEDURE — 99213 OFFICE O/P EST LOW 20 MIN: CPT | Performed by: INTERNAL MEDICINE

## 2023-10-19 PROCEDURE — 1159F MED LIST DOCD IN RCRD: CPT | Performed by: INTERNAL MEDICINE

## 2023-10-19 PROCEDURE — 84443 ASSAY THYROID STIM HORMONE: CPT

## 2023-10-19 PROCEDURE — 3074F SYST BP LT 130 MM HG: CPT | Performed by: INTERNAL MEDICINE

## 2023-10-19 PROCEDURE — 36415 COLL VENOUS BLD VENIPUNCTURE: CPT

## 2023-10-19 PROCEDURE — 3078F DIAST BP <80 MM HG: CPT | Performed by: INTERNAL MEDICINE

## 2023-10-19 PROCEDURE — 1125F AMNT PAIN NOTED PAIN PRSNT: CPT | Performed by: INTERNAL MEDICINE

## 2023-10-19 PROCEDURE — 1036F TOBACCO NON-USER: CPT | Performed by: INTERNAL MEDICINE

## 2023-10-19 ASSESSMENT — ENCOUNTER SYMPTOMS
PALPITATIONS: 0
FATIGUE: 0
NAUSEA: 0
SHORTNESS OF BREATH: 0
HEADACHES: 0
COUGH: 0
FEVER: 0
CHILLS: 0
VOMITING: 0
DIARRHEA: 0

## 2023-10-19 NOTE — PROGRESS NOTES
"Subjective   Patient ID: Ana Cavanaugh is a 67 y.o. female who presents for Goiter.  HPI  Last seen in 2022.  At that time seen virtually for MNG.  Nodules at that time did not meet criteria for FNA.   Forgot to follow up but then advised by pcp as had PET for follo wup lung nodules that showed +uptake in right thyroid .    No recent labs or ultrasound.  Feeling fine beyond issues with knees.   No dysphagia    Review of Systems   Constitutional:  Negative for chills, fatigue and fever.   Respiratory:  Negative for cough and shortness of breath.    Cardiovascular:  Negative for chest pain and palpitations.   Gastrointestinal:  Negative for diarrhea, nausea and vomiting.   Neurological:  Negative for headaches.       Objective     10/19/2023 9:10 AM    /70   Pulse 80   Resp 16   Weight 102 kg (224 lb 9.6 oz)   Height 1.674 m (5' 5.91\")       Physical Exam  Constitutional:       Appearance: Normal appearance. She is overweight.   HENT:      Head: Normocephalic and atraumatic.   Neck:      Thyroid: Thyromegaly present. No thyroid mass or thyroid tenderness.      Comments: Mildly enlarged thyroid gland low in neck no palpable dominant nodules  Cardiovascular:      Rate and Rhythm: Normal rate and regular rhythm.      Heart sounds: No murmur heard.     No gallop.   Pulmonary:      Effort: Pulmonary effort is normal.      Breath sounds: Normal breath sounds.   Abdominal:      Palpations: Abdomen is soft.      Comments: benign   Neurological:      General: No focal deficit present.      Mental Status: She is alert and oriented to person, place, and time.      Deep Tendon Reflexes: Reflexes are normal and symmetric.   Psychiatric:         Behavior: Behavior is cooperative.         Assessment/Plan   Problem List Items Addressed This Visit             ICD-10-CM    Multinodular non-toxic goiter - Primary E04.2    Relevant Orders    TSH with reflex to Free T4 if abnormal    US thyroid   Discussed coursed  Reviewed PET " uptake in nodules is fairly common but is due for ultrasound  Also due for tsh  Will plan for further testing based on these results

## 2023-10-24 ENCOUNTER — TREATMENT (OUTPATIENT)
Dept: PHYSICAL THERAPY | Facility: CLINIC | Age: 68
End: 2023-10-24
Payer: MEDICARE

## 2023-10-24 DIAGNOSIS — M25.661 STIFFNESS OF RIGHT KNEE, NOT ELSEWHERE CLASSIFIED: ICD-10-CM

## 2023-10-24 DIAGNOSIS — M25.461 EFFUSION, RIGHT KNEE: ICD-10-CM

## 2023-10-24 DIAGNOSIS — M25.561 PAIN IN JOINT OF RIGHT KNEE: Primary | ICD-10-CM

## 2023-10-24 PROCEDURE — 97110 THERAPEUTIC EXERCISES: CPT | Mod: GP

## 2023-10-24 NOTE — PROGRESS NOTES
"Physical Therapy Treatment    Patient Name: Ana Cavanaugh  MRN: [id number]  Today's Date: 10/10/2023  Time Calculation  Start Time: 1013  Stop Time: 1058  Time Calculation (min): 45 min      Assessment:   Patient tolerated session well today.  Patient has demonstrated that she is ready to be discharged via accomplishing all goals established and the plan of care.  Patient was educated to continue to keep up with HEP and to reach out if she has any questions in the future.  Patient has made excellent progress during her episode of care and will be discharged today.    Plan:  Cont with strengthening as tolerated  OP PT Plan  PT Plan: Skilled PT  Certification Period Start Date: 09/19/23  Certification Period End Date: 11/19/23  Number of Treatments Authorized: 5/MN       Current Problem  1. Pain in joint of right knee        2. Stiffness of right knee, not elsewhere classified        3. Effusion, right knee              PRECAUTIONS:  Pacemaker placed 3/2023 - NO LIFTING OVER 5#    Subjective     Pt continues to feel like she has made good progress and that she will be ready for discharge after today.     Pain    0/10     Objective   (10/24/23)   R knee ROM 0-126*    LEFS: 58/80    LE MMT  Knee flexion: 5/5 BL  Knee extension:  5/5 BL      Treatments:  Therapeutic Exercise  rec bike x 6 min Man/L2  AROM R knee flexion 1 x 20   LE MMT 1 x 1   Glute bridge 2 x 10   Banded flexion (blue tb) 3 x 10   Banded extension (blue tb) 3 x 10  6\" step up 2 x 10   Banded side steps 2 x 10   Pt education on continuing with HEP        Discharge Summary      Discharge Summary: PT    Discharge Information: Date of discharge 10/24/23, Date of last visit 10/24/23, Date of evaluation 9/13/23, Number of attended visits 5, Referred by Dr. Bermudez , and Referred for R TKR     Therapy Summary: Achieved all goals established in POC    Discharge Status: Pt feels like she is better than her PLOF and pain free      Rehab Discharge Reason: " Achieved all and/or the most significant goals(s)        Goals: ALL GOALS MET  Resolved       PT Problem       PT Goal 1 (Met)       Start:  10/03/23    Resolved:  10/24/23    1. Pt will increase LEFS with 65/80 or better in order to demo an increase in L knee function  2. Pt will increase R knee ROM to 0-130 in order to demo an increase in knee functional mobility   3. Pt will demo 4+/5 or all hip/knee MMT in order to demo an increase in LE strength   4. Pt will be able to ascend and descend 10 steps with reciprocal gait pattern and proper knee control in order to demo and increase in functional mobility  5. Pt will demo compliance and independence with HEP   6. Pt will be able to walk for 10 mins with step through gait pattern and non antalgic gait pattern with no AD

## 2023-10-30 ENCOUNTER — HOSPITAL ENCOUNTER (OUTPATIENT)
Dept: RADIOLOGY | Facility: CLINIC | Age: 68
Discharge: HOME | End: 2023-10-30
Payer: MEDICARE

## 2023-10-30 DIAGNOSIS — E04.2 MULTINODULAR NON-TOXIC GOITER: ICD-10-CM

## 2023-10-30 PROCEDURE — 76536 US EXAM OF HEAD AND NECK: CPT

## 2023-10-30 PROCEDURE — 76536 US EXAM OF HEAD AND NECK: CPT | Performed by: RADIOLOGY

## 2023-10-31 ENCOUNTER — APPOINTMENT (OUTPATIENT)
Dept: PHYSICAL THERAPY | Facility: CLINIC | Age: 68
End: 2023-10-31
Payer: MEDICARE

## 2023-11-15 ENCOUNTER — LAB (OUTPATIENT)
Dept: LAB | Facility: LAB | Age: 68
End: 2023-11-15
Payer: MEDICARE

## 2023-11-15 ENCOUNTER — OFFICE VISIT (OUTPATIENT)
Dept: CARDIOLOGY | Facility: HOSPITAL | Age: 68
End: 2023-11-15
Payer: MEDICARE

## 2023-11-15 VITALS
BODY MASS INDEX: 35.52 KG/M2 | DIASTOLIC BLOOD PRESSURE: 57 MMHG | HEART RATE: 89 BPM | OXYGEN SATURATION: 98 % | SYSTOLIC BLOOD PRESSURE: 98 MMHG | WEIGHT: 221 LBS | HEIGHT: 66 IN

## 2023-11-15 DIAGNOSIS — I50.22 CHRONIC SYSTOLIC CONGESTIVE HEART FAILURE (MULTI): ICD-10-CM

## 2023-11-15 DIAGNOSIS — E78.5 DYSLIPIDEMIA: ICD-10-CM

## 2023-11-15 DIAGNOSIS — I10 HYPERTENSION, UNSPECIFIED TYPE: ICD-10-CM

## 2023-11-15 DIAGNOSIS — I42.9 CARDIOMYOPATHY, UNSPECIFIED TYPE (MULTI): ICD-10-CM

## 2023-11-15 DIAGNOSIS — E78.5 DYSLIPIDEMIA: Primary | ICD-10-CM

## 2023-11-15 LAB
CHOLEST SERPL-MCNC: 123 MG/DL (ref 0–199)
CHOLESTEROL/HDL RATIO: 2.2
ERYTHROCYTE [DISTWIDTH] IN BLOOD BY AUTOMATED COUNT: 13.6 % (ref 11.5–14.5)
HCT VFR BLD AUTO: 35.8 % (ref 36–46)
HDLC SERPL-MCNC: 56.6 MG/DL
HGB BLD-MCNC: 11.9 G/DL (ref 12–16)
LDLC SERPL CALC-MCNC: 49 MG/DL
MCH RBC QN AUTO: 31.2 PG (ref 26–34)
MCHC RBC AUTO-ENTMCNC: 33.2 G/DL (ref 32–36)
MCV RBC AUTO: 94 FL (ref 80–100)
NON HDL CHOLESTEROL: 66 MG/DL (ref 0–149)
NRBC BLD-RTO: 0 /100 WBCS (ref 0–0)
PLATELET # BLD AUTO: 171 X10*3/UL (ref 150–450)
RBC # BLD AUTO: 3.82 X10*6/UL (ref 4–5.2)
TRIGL SERPL-MCNC: 86 MG/DL (ref 0–149)
VLDL: 17 MG/DL (ref 0–40)
WBC # BLD AUTO: 4.4 X10*3/UL (ref 4.4–11.3)

## 2023-11-15 PROCEDURE — 1160F RVW MEDS BY RX/DR IN RCRD: CPT | Performed by: NURSE PRACTITIONER

## 2023-11-15 PROCEDURE — 99214 OFFICE O/P EST MOD 30 MIN: CPT | Performed by: NURSE PRACTITIONER

## 2023-11-15 PROCEDURE — 85027 COMPLETE CBC AUTOMATED: CPT

## 2023-11-15 PROCEDURE — 80061 LIPID PANEL: CPT

## 2023-11-15 PROCEDURE — 36415 COLL VENOUS BLD VENIPUNCTURE: CPT

## 2023-11-15 PROCEDURE — 1036F TOBACCO NON-USER: CPT | Performed by: NURSE PRACTITIONER

## 2023-11-15 PROCEDURE — 93010 ELECTROCARDIOGRAM REPORT: CPT | Performed by: INTERNAL MEDICINE

## 2023-11-15 PROCEDURE — 1125F AMNT PAIN NOTED PAIN PRSNT: CPT | Performed by: NURSE PRACTITIONER

## 2023-11-15 PROCEDURE — 1159F MED LIST DOCD IN RCRD: CPT | Performed by: NURSE PRACTITIONER

## 2023-11-15 PROCEDURE — 93005 ELECTROCARDIOGRAM TRACING: CPT | Performed by: NURSE PRACTITIONER

## 2023-11-15 PROCEDURE — 3078F DIAST BP <80 MM HG: CPT | Performed by: NURSE PRACTITIONER

## 2023-11-15 PROCEDURE — 80048 BASIC METABOLIC PNL TOTAL CA: CPT

## 2023-11-15 PROCEDURE — 3074F SYST BP LT 130 MM HG: CPT | Performed by: NURSE PRACTITIONER

## 2023-11-15 RX ORDER — METOPROLOL SUCCINATE 100 MG/1
TABLET, EXTENDED RELEASE ORAL
Qty: 135 TABLET | Refills: 3 | Status: SHIPPED | OUTPATIENT
Start: 2023-11-15

## 2023-11-15 ASSESSMENT — ENCOUNTER SYMPTOMS
LOSS OF SENSATION IN FEET: 0
OCCASIONAL FEELINGS OF UNSTEADINESS: 1
DEPRESSION: 0

## 2023-11-15 NOTE — PATIENT INSTRUCTIONS
Take Metoprolol Succinate 100 mg in the morning and 50 mg ( half tablet) in the PM  Continue all other cardiovascular medications  Follow up in 2 months  Check blood work CBC, BMP, and lipid panel  Continue physical activity and weight loss  Mediterranean diet  Establish care with primary care physician

## 2023-11-15 NOTE — PROGRESS NOTES
Primary Care Physician: Hesham Higgins MD  Date of Visit: 11/15/2023  8:30 AM EST  Location of visit: [facility]     Chief Complaint:   Chief Complaint   Patient presents with    Atrial Flutter    Hyperlipidemia    Heart Failure        HPI / Summary:   Ana Cavanaugh is a 67 y.o. female presents for followup. Seen in collaboration with Dr. Goodwin. She had knee replacement in August. She has been walking 30 minutes twice a day without chest pain or dyspnea. Denies chest pain, dyspnea, orthopnea, pnd, lightheadedness, dizziness, syncope, palpitations, lower extremity edema, or bleeding issues.                Past Medical History:  Past Medical History:   Diagnosis Date    Abnormal findings on diagnostic imaging of other specified body structures 05/03/2022    Thickened endometrium    Cardiac murmur, unspecified 02/12/2018    Heart murmur    Palpitations 10/23/2020    Palpitations    Personal history of other diseases of the female genital tract 05/03/2022    History of postmenopausal bleeding        Past Surgical History:  Past Surgical History:   Procedure Laterality Date    CT ABDOMEN PELVIS ANGIOGRAM W AND/OR WO IV CONTRAST  10/28/2021    CT ABDOMEN PELVIS ANGIOGRAM W AND/OR WO IV CONTRAST 10/28/2021 [facility] ANCILLARY LEGACY    CT ABDOMEN PELVIS ANGIOGRAM W AND/OR WO IV CONTRAST  3/18/2022    CT ABDOMEN PELVIS ANGIOGRAM W AND/OR WO IV CONTRAST 3/18/2022 [facility] ANCILLARY LEGACY    CT ABDOMEN PELVIS ANGIOGRAM W AND/OR WO IV CONTRAST  3/17/2023    CT ABDOMEN PELVIS ANGIOGRAM W AND/OR WO IV CONTRAST [facility] CT    CT ANGIO CORONARY ART WITH HEARTFLOW IF SCORE >30%  11/12/2020    CT HEART CORONARY ANGIOGRAM 11/12/2020 [facility] AIB LEGACY    CT GUIDED PERCUTANEOUS BIOPSY LUNG  5/25/2023    CT GUIDED PERCUTANEOUS BIOPSY LUNG 5/25/2023 [facility]    MR HEAD ANGIO WO IV CONTRAST  1/14/2022    MR HEAD ANGIO WO IV CONTRAST 1/14/2022 [facility] ANCILLARY LEGACY    MR NECK ANGIO W AND WO IV CONTRAST  1/14/2022    MR NECK ANGIO W AND WO IV CONTRAST  1/14/2022 [facility] ANCILLARY LEGACY    OTHER SURGICAL HISTORY  03/17/2015    Salpingectomy For Ectopic Pregnancy    OTHER SURGICAL HISTORY  05/20/2022    Dilation and curettage    OTHER SURGICAL HISTORY  08/04/2015    Tricuspid Valve Repair    OTHER SURGICAL HISTORY  02/11/2022    Mitral Valve Repair    UMBILICAL HERNIA REPAIR  03/17/2015    Umbilical Hernia Repair          Social History:   reports that she has quit smoking. Her smoking use included cigarettes. She has never used smokeless tobacco.     Family History:  family history includes Bleeding from varicose vein in her paternal grandmother; Breast cancer in her sister; Cardiac disorder in her mother; Hepatic cirrhosis in her father; Osteoporosis in her mother; Pulmonary fibrosis in her sister; Thyroid disease in her daughter.      Allergies:  Allergies   Allergen Reactions    Hay Fever And Allergy Relief Itching and Runny nose       Outpatient Medications:  Current Outpatient Medications   Medication Instructions    acetaminophen (Acetaminophen Extra Strength) 500 mg tablet 2 tablets, oral, Every 6 hours PRN    aspirin 81 mg EC tablet 1 tablet, oral, Daily    atorvastatin (Lipitor) 80 mg tablet 1 tablet, oral, Nightly    cholecalciferol (Vitamin D-3) 25 MCG (1000 UT) capsule oral, Daily    dapagliflozin (Farxiga) 10 mg 1 tablet, oral, Daily    furosemide (Lasix) 20 mg tablet 1 tablet, oral, Daily    metoprolol succinate XL (Toprol-XL) 100 mg 24 hr tablet 1 tablet, oral, Daily    multivitamin-min-iron-FA-vit K (Adults Multivitamin) 18 mg iron-400 mcg-25 mcg tablet 1 tablet, oral, Daily    omeprazole (PRILOSEC) 20 mg, oral, Daily    potassium chloride (KLOR-CON 10 ORAL) 1 tablet, oral, Daily    sacubitriL-valsartan (Entresto)  mg tablet 1 tablet, oral, 2 times daily    spironolactone (ALDACTONE) 25 mg, oral, Daily       Physical Exam:  Vitals:    11/15/23 0836   BP: 98/57   BP Location: Left arm   Patient Position: Sitting   BP Cuff Size: Large adult  "  Pulse: 89   SpO2: 98%   Weight: 100 kg (221 lb)   Height: 1.676 m (5' 6\")     Wt Readings from Last 5 Encounters:   11/15/23 100 kg (221 lb)   10/19/23 102 kg (224 lb 9.6 oz)   05/31/23 97.1 kg (214 lb 0.9 oz)   05/22/23 98.4 kg (217 lb)   05/03/23 98.4 kg (217 lb)     Body mass index is 35.67 kg/m².     GENERAL: alert, cooperative, pleasant, in no acute distress  SKIN: warm and dry  NECK: Normal JVD, negative HJR  CARDIAC: Regular rate and rhythm with 2/6 holosystolic murmur at apex, no rubs or gallops  CHEST: Normal respiratory efforts, lungs clear to auscultation bilaterally.  ABDOMEN: soft, nontender, nondistended  EXTREMITIES: no edema, +2 palpable RP bilaterally       Last Labs:  Recent Labs     08/17/23  0257 08/16/23  0517 08/02/23  1253   WBC CANCELED 10.8 4.9   HGB CANCELED 10.5* 12.7   HCT CANCELED 32.1* 39.3   PLT CANCELED 144* 152   MCV CANCELED 94 95.4     Recent Labs     08/17/23  0257 08/16/23  0517 08/02/23  1253   NA CANCELED 131* 139   K CANCELED 4.8 3.8   CL CANCELED 100 105   BUN CANCELED 18 16   CREATININE CANCELED 1.01 0.8     CMP -  Lab Results   Component Value Date    CALCIUM CANCELED 08/17/2023    PHOS 2.8 09/27/2022    PROT 7.6 08/02/2023    ALBUMIN 4.3 08/02/2023    AST 27 08/02/2023    ALT 27 08/02/2023    ALKPHOS 76 08/02/2023    BILITOT 0.5 08/02/2023       LIPID PANEL -   Lab Results   Component Value Date    CHOL 129 12/09/2022    HDL 63.0 12/09/2022    LDLF 51 12/09/2022    TRIG 75 12/09/2022       Lab Results   Component Value Date     (H) 05/29/2021    HGBA1C 6.0 08/02/2023       Last Cardiology Tests:  ECG:  Obtained and reviewed EKG- normal sinus rhythm HR 89 with frequent PVCs    Echo:  Echo Results:  11/30/22  CONCLUSIONS:   1. Left ventricular systolic function is mildly to moderately decreased with a 40% estimated ejection fraction.   2. Abnormal septal motion consistent with post-operative status.   3. Spectral Doppler shows a pseudonormal pattern of left " ventricular diastolic filling.   4. The left atrium is severely dilated.   5. The right atrium is moderately dilated.   6. Mild to moderate mitral valve regurgitation.   7. Mild aortic valve regurgitation.   8. There is global hypokinesis of the left ventricle with minor regional variations.         Cath:  21  Coronary Lesion Summary:  Vessel   Stenosis    Vessel Segment  RCA    100% stenosis     distal   CONCLUSIONS:   1. Severe single-vessel coronary artery disease in a right dominant system.   2. Unsuccessful distal RCA PCI due to failure to cross lesion with guidewire.   3. Overall normal left ventricular ejection fraction.       Cardiac MRI 23  [facility]                                                      CMR Report       MRN:                    [id number]                                  Name:             UMAIR HARVEY                                  :                  1955                                  Scan Date:   2023 13:28:38                                   Electronically signed by Mago Samuel  16:07:20     GENERAL INFORMATION   =====================================================================  =====================================     HEIGHT: 66.00 in    (167.64 cm)  WEIGHT: 211.00 lbs    (95.71 kgs)  BSA: 2.05 m\S\2  SCAN LOCATION: [facility]  REFERRING PHYSICIAN: SARAH PALACIOS  ATTENDING PHYSICIAN: SARAH PALACIOS  ACCESSION NUMBER: [id number]  ICD10 CODES: I42.9  PATIENT HISTORY: Device: No, Preg/Nursing: No, HT: 172.72 cm, WT:   93.437100 kg     SUMMARY   =====================================================================  =====================================     1. The left ventricle is enlarged with reduced systolic function.   LVEF 34%. No regional wall motion  abnormality.      2. Near transmural delayed enhancement involving the base and mid   inferoseptal and inferior  walls in  keeping with sequela of prior infarction with limited viability in   this region. The rest of the myocardium  demonstrate normal delayed enhancement with no definite evidence of   scarring or infiltrative process.      3. Biatrial enlargement, left more than right. Moderate mitral   regurgitation. Mild aortic regurgitation.      4. Status post median sternotomy and apparent mitral and tricuspid   valve repair.              Assessment/Plan   Diagnoses and all orders for this visit:  Dyslipidemia  -     ECG 12 lead (Clinic Performed)  -     Lipid panel; Future  Cardiomyopathy, unspecified type (CMS/HCC)  -     metoprolol succinate XL (Toprol-XL) 100 mg 24 hr tablet; TAKE 1 TABLET (100 MG) IN AM AND 1/2 TABLET (50 MG) IN PM  Chronic systolic congestive heart failure (CMS/HCC)  -     CBC; Future  -     Basic metabolic panel; Future  -     metoprolol succinate XL (Toprol-XL) 100 mg 24 hr tablet; TAKE 1 TABLET (100 MG) IN AM AND 1/2 TABLET (50 MG) IN PM  Hypertension, unspecified type    In summary Ms. Cavanaugh is a pleasant 67 year-old -American female with a past medical history significant for prior remote unspecified arrhythmia treated with digoxin, mitral and tricuspid valve regurgitation status post repair, postoperative atrial flutter status post internal closure of the left atrial appendage, chronic diastolic heart failure, obesity, and Spiritism. She is asymptomatic from a cardiac perspective. Her blood pressure is controlled. Given her cardiomyopathy I did increase her Metoprolol Succinate as above. She will call me if she has any dizziness or lightheadedness. She is euvolemic by clinical exam. 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 I have encouraged her to maintain a low sodium diet and daily weights. I have encouraged her to increased physical activity and lose weight. She will continue current cardiovascular medications. She will follow up in  2 months. She will call sooner with questions or concerns.        Orders:  No orders of the defined types were placed in this encounter.     Followup Appts:  Future Appointments   Date Time Provider Department Center   3/15/2024  9:00 AM [facility]   3/15/2024 10:00 AM Wilner Reyez MD [id number] [facility]   5/28/2024  9:00 AM Jean Pierre Jauregui MD [id number] [facility]   10/1/2024  1:00 PM Ellie Cavanaugh PA-C [id number] [facility]           ____________________________________________________________  Gini Bowers APRN-CNP  [facility] [place]  [facility]

## 2023-11-16 LAB
ANION GAP SERPL CALC-SCNC: 11 MMOL/L (ref 10–20)
BUN SERPL-MCNC: 17 MG/DL (ref 6–23)
CALCIUM SERPL-MCNC: 9.4 MG/DL (ref 8.6–10.3)
CHLORIDE SERPL-SCNC: 104 MMOL/L (ref 98–107)
CO2 SERPL-SCNC: 27 MMOL/L (ref 21–32)
CREAT SERPL-MCNC: 0.72 MG/DL (ref 0.5–1.05)
GFR SERPL CREATININE-BSD FRML MDRD: >90 ML/MIN/1.73M*2
GLUCOSE SERPL-MCNC: 97 MG/DL (ref 74–99)
POTASSIUM SERPL-SCNC: 4.2 MMOL/L (ref 3.5–5.3)
SODIUM SERPL-SCNC: 138 MMOL/L (ref 136–145)

## 2023-12-03 LAB
ATRIAL RATE: 35 BPM
Q ONSET: 225 MS
QRS COUNT: 14 BEATS
QRS DURATION: 90 MS
QT INTERVAL: 388 MS
QTC CALCULATION(BAZETT): 472 MS
QTC FREDERICIA: 442 MS
R AXIS: -14 DEGREES
T AXIS: 3 DEGREES
T OFFSET: 419 MS
VENTRICULAR RATE: 89 BPM

## 2023-12-18 ENCOUNTER — HOSPITAL ENCOUNTER (OUTPATIENT)
Dept: CARDIOLOGY | Facility: CLINIC | Age: 68
Discharge: HOME | End: 2023-12-18
Payer: MEDICARE

## 2023-12-18 DIAGNOSIS — I47.29 OTHER VENTRICULAR TACHYCARDIA (MULTI): ICD-10-CM

## 2023-12-18 DIAGNOSIS — Z95.810 PRESENCE OF AUTOMATIC (IMPLANTABLE) CARDIAC DEFIBRILLATOR: ICD-10-CM

## 2023-12-18 PROCEDURE — 93295 DEV INTERROG REMOTE 1/2/MLT: CPT | Performed by: INTERNAL MEDICINE

## 2023-12-18 PROCEDURE — 93296 REM INTERROG EVL PM/IDS: CPT

## 2023-12-26 ENCOUNTER — HOSPITAL ENCOUNTER (OUTPATIENT)
Dept: RADIOLOGY | Facility: CLINIC | Age: 68
Discharge: HOME | End: 2023-12-26
Payer: MEDICARE

## 2023-12-26 DIAGNOSIS — Z12.31 SCREENING MAMMOGRAM FOR BREAST CANCER: ICD-10-CM

## 2023-12-26 PROCEDURE — 77067 SCR MAMMO BI INCL CAD: CPT

## 2023-12-26 PROCEDURE — 77063 BREAST TOMOSYNTHESIS BI: CPT | Performed by: RADIOLOGY

## 2023-12-26 PROCEDURE — 77067 SCR MAMMO BI INCL CAD: CPT | Performed by: RADIOLOGY

## 2024-01-17 ENCOUNTER — OFFICE VISIT (OUTPATIENT)
Dept: CARDIOLOGY | Facility: HOSPITAL | Age: 69
End: 2024-01-17
Payer: COMMERCIAL

## 2024-01-17 VITALS
BODY MASS INDEX: 36.64 KG/M2 | HEART RATE: 58 BPM | WEIGHT: 228 LBS | OXYGEN SATURATION: 97 % | HEIGHT: 66 IN | SYSTOLIC BLOOD PRESSURE: 95 MMHG | DIASTOLIC BLOOD PRESSURE: 62 MMHG

## 2024-01-17 DIAGNOSIS — Z98.890 H/O MITRAL VALVE REPAIR: ICD-10-CM

## 2024-01-17 DIAGNOSIS — I10 HYPERTENSION, UNSPECIFIED TYPE: Primary | ICD-10-CM

## 2024-01-17 DIAGNOSIS — I50.22 CHRONIC SYSTOLIC CONGESTIVE HEART FAILURE (MULTI): ICD-10-CM

## 2024-01-17 DIAGNOSIS — I34.0 MITRAL VALVE INSUFFICIENCY, UNSPECIFIED ETIOLOGY: ICD-10-CM

## 2024-01-17 DIAGNOSIS — I42.9 CARDIOMYOPATHY, UNSPECIFIED TYPE (MULTI): ICD-10-CM

## 2024-01-17 PROCEDURE — 3074F SYST BP LT 130 MM HG: CPT | Performed by: NURSE PRACTITIONER

## 2024-01-17 PROCEDURE — 1036F TOBACCO NON-USER: CPT | Performed by: NURSE PRACTITIONER

## 2024-01-17 PROCEDURE — 99214 OFFICE O/P EST MOD 30 MIN: CPT | Performed by: NURSE PRACTITIONER

## 2024-01-17 PROCEDURE — 1160F RVW MEDS BY RX/DR IN RCRD: CPT | Performed by: NURSE PRACTITIONER

## 2024-01-17 PROCEDURE — 93005 ELECTROCARDIOGRAM TRACING: CPT | Performed by: NURSE PRACTITIONER

## 2024-01-17 PROCEDURE — 1125F AMNT PAIN NOTED PAIN PRSNT: CPT | Performed by: NURSE PRACTITIONER

## 2024-01-17 PROCEDURE — 93010 ELECTROCARDIOGRAM REPORT: CPT | Performed by: INTERNAL MEDICINE

## 2024-01-17 PROCEDURE — 3078F DIAST BP <80 MM HG: CPT | Performed by: NURSE PRACTITIONER

## 2024-01-17 PROCEDURE — 1159F MED LIST DOCD IN RCRD: CPT | Performed by: NURSE PRACTITIONER

## 2024-01-17 NOTE — PROGRESS NOTES
Primary Care Physician: Hesham Higgins MD  Date of Visit: 01/17/2024  8:30 AM EST  Location of visit: [facility]     Chief Complaint:   Chief Complaint   Patient presents with    Heart Failure    Hypertension    Cardiomyopathy        HPI / Summary:   Ana Cavanaugh is a 68 y.o. female presents for followup. Seen in collaboration with Dr. Goodwin. She has been walking on the treadmill 2 miles three days per week and participating in an exercise class for up to 45 minutes twice a week without chest pain or dyspnea. She has been able to walk up and down 2 flights of stairs without symptoms. Denies chest pain, dyspnea, orthopnea, pnd, lightheadedness, dizziness, syncope, palpitations, lower extremity edema, or bleeding issues.                Past Medical History:  Past Medical History:   Diagnosis Date    Abnormal findings on diagnostic imaging of other specified body structures 05/03/2022    Thickened endometrium    Cardiac murmur, unspecified 02/12/2018    Heart murmur    Palpitations 10/23/2020    Palpitations    Personal history of other diseases of the female genital tract 05/03/2022    History of postmenopausal bleeding        Past Surgical History:  Past Surgical History:   Procedure Laterality Date    CT ABDOMEN PELVIS ANGIOGRAM W AND/OR WO IV CONTRAST  10/28/2021    CT ABDOMEN PELVIS ANGIOGRAM W AND/OR WO IV CONTRAST 10/28/2021 [facility] ANCILLARY LEGACY    CT ABDOMEN PELVIS ANGIOGRAM W AND/OR WO IV CONTRAST  3/18/2022    CT ABDOMEN PELVIS ANGIOGRAM W AND/OR WO IV CONTRAST 3/18/2022 [facility] ANCILLARY LEGACY    CT ABDOMEN PELVIS ANGIOGRAM W AND/OR WO IV CONTRAST  3/17/2023    CT ABDOMEN PELVIS ANGIOGRAM W AND/OR WO IV CONTRAST [facility] CT    CT ANGIO CORONARY ART WITH HEARTFLOW IF SCORE >30%  11/12/2020    CT HEART CORONARY ANGIOGRAM 11/12/2020 [facility] AIB LEGACY    CT GUIDED PERCUTANEOUS BIOPSY LUNG  5/25/2023    CT GUIDED PERCUTANEOUS BIOPSY LUNG 5/25/2023 [facility] CT    MR HEAD ANGIO WO IV CONTRAST  1/14/2022    MR HEAD ANGIO  WO IV CONTRAST 1/14/2022 [facility]CILL[facility] LEGACY    MR NECK ANGIO W AND WO IV CONTRAST  1/14/2022    MR NECK ANGIO W AND WO IV CONTRAST 1/14/2022 [facility] ANCILLARY LEGACY    OTHER SURGICAL HISTORY  03/17/2015    Salpingectomy For Ectopic Pregnancy    OTHER SURGICAL HISTORY  05/20/2022    Dilation and curettage    OTHER SURGICAL HISTORY  08/04/2015    Tricuspid Valve Repair    OTHER SURGICAL HISTORY  02/11/2022    Mitral Valve Repair    UMBILICAL HERNIA REPAIR  03/17/2015    Umbilical Hernia Repair          Social History:   reports that she has quit smoking. Her smoking use included cigarettes. She has never used smokeless tobacco.     Family History:  family history includes Bleeding from varicose vein in her paternal grandmother; Breast cancer in her sister; Cardiac disorder in her mother; Hepatic cirrhosis in her father; Osteoporosis in her mother; Pulmonary fibrosis in her sister; Thyroid disease in her daughter.      Allergies:  Allergies   Allergen Reactions    Hay Fever And Allergy Relief Itching and Runny nose       Outpatient Medications:  Current Outpatient Medications   Medication Instructions    acetaminophen (Acetaminophen Extra Strength) 500 mg tablet 2 tablets, oral, Every 6 hours PRN    aspirin 81 mg EC tablet 1 tablet, oral, Daily    atorvastatin (Lipitor) 80 mg tablet 1 tablet, oral, Nightly    cholecalciferol (Vitamin D-3) 25 MCG (1000 UT) capsule oral, Daily    dapagliflozin (Farxiga) 10 mg 1 tablet, oral, Daily    furosemide (Lasix) 20 mg tablet 1 tablet, oral, Daily    metoprolol succinate XL (Toprol-XL) 100 mg 24 hr tablet TAKE 1 TABLET (100 MG) IN AM AND 1/2 TABLET (50 MG) IN PM    multivitamin-min-iron-FA-vit K (Adults Multivitamin) 18 mg iron-400 mcg-25 mcg tablet 1 tablet, oral, Daily    omeprazole (PRILOSEC) 20 mg, oral, Daily    potassium chloride (KLOR-CON 10 ORAL) 1 tablet, oral, Daily    sacubitriL-valsartan (Entresto)  mg tablet 1 tablet, oral, 2 times daily    spironolactone  "(ALDACTONE) 25 mg, oral, Daily       Physical Exam:  Vitals:    01/17/24 0839   BP: 95/62   BP Location: Left arm   Patient Position: Sitting   BP Cuff Size: Large adult   Pulse: 58   SpO2: 97%   Weight: 103 kg (228 lb)   Height: 1.676 m (5' 6\")     Wt Readings from Last 5 Encounters:   01/17/24 103 kg (228 lb)   11/15/23 100 kg (221 lb)   10/19/23 102 kg (224 lb 9.6 oz)   05/31/23 97.1 kg (214 lb 0.9 oz)   05/22/23 98.4 kg (217 lb)     Body mass index is 36.8 kg/m².     GENERAL: alert, cooperative, pleasant, in no acute distress  SKIN: warm and dry  NECK: Normal JVD, negative HJR  CARDIAC: Regular rate and rhythm with 2/6 holosystolic murmur at apex, no rubs or gallops  CHEST: Normal respiratory efforts, lungs clear to auscultation bilaterally.  ABDOMEN: soft, nontender, nondistended  EXTREMITIES: no edema, +2 palpable RP bilaterally       Last Labs:  Recent Labs     11/15/23  0946 08/17/23  0257 08/16/23  0517   WBC 4.4 CANCELED 10.8   HGB 11.9* CANCELED 10.5*   HCT 35.8* CANCELED 32.1*    CANCELED 144*   MCV 94 CANCELED 94     Recent Labs     11/15/23  0946 08/17/23  0257 08/16/23  0517    CANCELED 131*   K 4.2 CANCELED 4.8    CANCELED 100   BUN 17 CANCELED 18   CREATININE 0.72 CANCELED 1.01     CMP -  Lab Results   Component Value Date    CALCIUM 9.4 11/15/2023    PHOS 2.8 09/27/2022    PROT 7.6 08/02/2023    ALBUMIN 4.3 08/02/2023    AST 27 08/02/2023    ALT 27 08/02/2023    ALKPHOS 76 08/02/2023    BILITOT 0.5 08/02/2023       LIPID PANEL -   Lab Results   Component Value Date    CHOL 123 11/15/2023    HDL 56.6 11/15/2023    LDLF 51 12/09/2022    TRIG 86 11/15/2023       Lab Results   Component Value Date     (H) 05/29/2021    HGBA1C 6.0 08/02/2023       Last Cardiology Tests:  ECG:  Obtained and reviewed EKG- normal sinus rhythm HR 89 with frequent PVCs    Echo:  Echo Results:  11/30/22  CONCLUSIONS:   1. Left ventricular systolic function is mildly to moderately decreased with a 40% " estimated ejection fraction.   2. Abnormal septal motion consistent with post-operative status.   3. Spectral Doppler shows a pseudonormal pattern of left ventricular diastolic filling.   4. The left atrium is severely dilated.   5. The right atrium is moderately dilated.   6. Mild to moderate mitral valve regurgitation.   7. Mild aortic valve regurgitation.   8. There is global hypokinesis of the left ventricle with minor regional variations.         Cath:  21  Coronary Lesion Summary:  Vessel   Stenosis    Vessel Segment  RCA    100% stenosis     distal   CONCLUSIONS:   1. Severe single-vessel coronary artery disease in a right dominant system.   2. Unsuccessful distal RCA PCI due to failure to cross lesion with guidewire.   3. Overall normal left ventricular ejection fraction.       Cardiac MRI 23  [facility]                                                      CMR Report       MRN:                    [id number]                                  Name:             UMAIR HARVEY                                  :                  1955                                  Scan Date:   2023 13:28:38                                   Electronically signed by Mago Samuel  16:07:20     GENERAL INFORMATION   =====================================================================  =====================================     HEIGHT: 66.00 in    (167.64 cm)  WEIGHT: 211.00 lbs    (95.71 kgs)  BSA: 2.05 m\S\2  SCAN LOCATION: [facility]  REFERRING PHYSICIAN: SARAH PALACIOS  ATTENDING PHYSICIAN: SARAH PALACIOS  ACCESSION NUMBER: [id number]  ICD10 CODES: I42.9  PATIENT HISTORY: Device: No, Preg/Nursing: No, HT: 172.72 cm, WT:   93.742155 kg     SUMMARY   =====================================================================  =====================================     1. The left ventricle is enlarged with reduced systolic  function.   LVEF 34%. No regional wall motion  abnormality.      2. Near transmural delayed enhancement involving the base and mid   inferoseptal and inferior walls in  keeping with sequela of prior infarction with limited viability in   this region. The rest of the myocardium  demonstrate normal delayed enhancement with no definite evidence of   scarring or infiltrative process.      3. Biatrial enlargement, left more than right. Moderate mitral   regurgitation. Mild aortic regurgitation.      4. Status post median sternotomy and apparent mitral and tricuspid   valve repair.              Assessment/Plan   Diagnoses and all orders for this visit:  Dyslipidemia  -     ECG 12 lead (Clinic Performed)  -     Lipid panel; Future  Cardiomyopathy, unspecified type (CMS/HCC)  -     metoprolol succinate XL (Toprol-XL) 100 mg 24 hr tablet; TAKE 1 TABLET (100 MG) IN AM AND 1/2 TABLET (50 MG) IN PM  Chronic systolic congestive heart failure (CMS/HCC)  -     CBC; Future  -     Basic metabolic panel; Future  -     metoprolol succinate XL (Toprol-XL) 100 mg 24 hr tablet; TAKE 1 TABLET (100 MG) IN AM AND 1/2 TABLET (50 MG) IN PM  Hypertension, unspecified type    In summary Ms. Cavanaugh is a pleasant 68 year-old -American female with a past medical history significant for prior remote unspecified arrhythmia treated with digoxin, mitral and tricuspid valve regurgitation status post repair, postoperative atrial flutter status post internal closure of the left atrial appendage, chronic systolic and diastolic heart failure, non ischemic cardiomyopathy s/p ICD, coronary artery disease s/p NSTEMI 5/21 with unsuccessful PCI to occluded RCA (?scad),obesity, and [employer]. She is asymptomatic from a cardiac perspective. Her blood pressure is soft. She is asymptomatic. I did not titrate medications She is euvolemic by clinical exam. 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 I have  encouraged her to continue physical activity and weight loss efforts. I have ordered labs as above. She will continue current cardiovascular medications. She will follow up in 6  months with an echocardiogram for surveillance of mitral valve and LV function. She will call sooner with questions or concerns.        Orders:  No orders of the defined types were placed in this encounter.     Followup Appts:  Future Appointments   Date Time Provider Department Center   3/15/2024  9:00 AM [facility] 1 [facility]   3/15/2024 10:00 AM Wilner Reyez MD [id number] [facility]   5/28/2024  9:00 AM Jean Pierre Jauregui MD [id number] [facility]   8/6/2024 11:40 AM Jose Goodwin MD [id number] [facility]   10/1/2024  1:00 PM Ellie Cavanaugh PA-C [id number] [facility]           ____________________________________________________________  Gini Bowers, APRN-CNP  [facility]  [facility]   Patient

## 2024-01-17 NOTE — PATIENT INSTRUCTIONS
Continue current meds  Follow up in 6 months with an echo  Encourage weight loss and continue activity  Check blood work in April CBC and BMP

## 2024-02-11 DIAGNOSIS — K21.9 GASTRO-ESOPHAGEAL REFLUX DISEASE WITHOUT ESOPHAGITIS: ICD-10-CM

## 2024-02-12 RX ORDER — OMEPRAZOLE 20 MG/1
20 CAPSULE, DELAYED RELEASE ORAL DAILY
Qty: 90 CAPSULE | Refills: 3 | Status: SHIPPED | OUTPATIENT
Start: 2024-02-12

## 2024-02-18 LAB
ATRIAL RATE: 58 BPM
P AXIS: 55 DEGREES
P OFFSET: 201 MS
P ONSET: 145 MS
PR INTERVAL: 158 MS
Q ONSET: 224 MS
QRS COUNT: 9 BEATS
QRS DURATION: 90 MS
QT INTERVAL: 420 MS
QTC CALCULATION(BAZETT): 412 MS
QTC FREDERICIA: 415 MS
R AXIS: -17 DEGREES
T AXIS: -11 DEGREES
T OFFSET: 434 MS
VENTRICULAR RATE: 58 BPM

## 2024-03-15 ENCOUNTER — HOSPITAL ENCOUNTER (OUTPATIENT)
Dept: VASCULAR MEDICINE | Facility: HOSPITAL | Age: 69
Discharge: HOME | End: 2024-03-15
Payer: COMMERCIAL

## 2024-03-15 ENCOUNTER — OFFICE VISIT (OUTPATIENT)
Dept: VASCULAR SURGERY | Facility: HOSPITAL | Age: 69
End: 2024-03-15
Payer: COMMERCIAL

## 2024-03-15 VITALS
HEIGHT: 66 IN | DIASTOLIC BLOOD PRESSURE: 56 MMHG | BODY MASS INDEX: 36.48 KG/M2 | WEIGHT: 227 LBS | OXYGEN SATURATION: 99 % | HEART RATE: 84 BPM | SYSTOLIC BLOOD PRESSURE: 110 MMHG

## 2024-03-15 DIAGNOSIS — I72.8 SPLENIC ARTERY ANEURYSM (CMS-HCC): ICD-10-CM

## 2024-03-15 DIAGNOSIS — I72.2: Primary | ICD-10-CM

## 2024-03-15 DIAGNOSIS — I77.73: ICD-10-CM

## 2024-03-15 DIAGNOSIS — K55.9 ACUTE VASCULAR DISORDER OF INTESTINE (MULTI): ICD-10-CM

## 2024-03-15 DIAGNOSIS — I72.8 ANEURYSM OF OTHER SPECIFIED ARTERIES (CMS-HCC): ICD-10-CM

## 2024-03-15 PROCEDURE — 1036F TOBACCO NON-USER: CPT | Performed by: SURGERY

## 2024-03-15 PROCEDURE — 3078F DIAST BP <80 MM HG: CPT | Performed by: SURGERY

## 2024-03-15 PROCEDURE — 1160F RVW MEDS BY RX/DR IN RCRD: CPT | Performed by: SURGERY

## 2024-03-15 PROCEDURE — 1159F MED LIST DOCD IN RCRD: CPT | Performed by: SURGERY

## 2024-03-15 PROCEDURE — 99213 OFFICE O/P EST LOW 20 MIN: CPT | Performed by: SURGERY

## 2024-03-15 PROCEDURE — 93975 VASCULAR STUDY: CPT

## 2024-03-15 PROCEDURE — 93975 VASCULAR STUDY: CPT | Performed by: INTERNAL MEDICINE

## 2024-03-15 PROCEDURE — 3074F SYST BP LT 130 MM HG: CPT | Performed by: SURGERY

## 2024-03-15 ASSESSMENT — ENCOUNTER SYMPTOMS
DEPRESSION: 0
OCCASIONAL FEELINGS OF UNSTEADINESS: 0
LOSS OF SENSATION IN FEET: 0

## 2024-03-15 NOTE — PROGRESS NOTES
History Of Present Illness  Ana Cavanaugh is a 68 y.o. 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  She denies any abdominal or flank pain that may be attributable to the aneurysm.  She had a duplex scan of the renal arteries and the splenic artery today which showed no significant change in the size.    Past Medical History  She has a past medical history of Abnormal findings on diagnostic imaging of other specified body structures (05/03/2022), Cardiac murmur, unspecified (02/12/2018), Palpitations (10/23/2020), and Personal history of other diseases of the female genital tract (05/03/2022).    Surgical History  She has a past surgical history that includes Other surgical history (03/17/2015); Umbilical hernia repair (03/17/2015); Other surgical history (05/20/2022); Other surgical history (08/04/2015); Other surgical history (02/11/2022); CT angio coronary art with heartflow if score >30% (11/12/2020); CT angio abdomen pelvis w and or wo IV IV contrast (10/28/2021); MR angio head wo IV contrast (1/14/2022); MR angio neck w and wo IV contrast (1/14/2022); CT angio abdomen pelvis w and or wo IV IV contrast (3/18/2022); CT angio abdomen pelvis w and or wo IV IV contrast (3/17/2023); and CT guided percutaneous biopsy lung (5/25/2023).     Social History  She reports that she quit smoking about 33 years ago. Her smoking use included cigarettes. She has never used smokeless tobacco. No history on file for alcohol use and drug use.    Family History  Family History   Problem Relation Name Age of Onset    Other (Cardiac disorder) Mother      Osteoporosis Mother      Other (Hepatic cirrhosis) Father      Breast cancer Sister      Pulmonary fibrosis Sister      Thyroid disease Daughter      Other (Bleeding from varicose vein) Paternal Grandmother          Allergies  Hay fever and allergy relief    Review of Systems  Noncontributory    Physical Exam  Gen: alert  "and awake. Oriented to time, place and person. In no acute distress.  Morbidly obese  HEENT: normocephalic, sclera non icteric. EOMI. Supple without lymphadenopathy  Cor: RRR,   Lung: Unlabored breathing  Abd: soft, non tender and non distended.  No abdominal aortic aneurysm appreciated.  However the abdomen is protuberant  Ext: warm and well perfused. No evidence of arterial ischemia. No evidence of venous insufficiency.   Pulse Exam:    Carotid Subclav Brach Rad Fem Pop DP PT   Right 4+ 4+ 4+ 4+ NP  NP NP   Left 4+ 4+ 4+ 4+ NP  NP NP     Doppler signals:    Psych: Normal    Last Recorded Vitals  Blood pressure 110/56, pulse 84, height 1.676 m (5' 6\"), weight 103 kg (227 lb), SpO2 99 %.       Assessment/Plan   Splenic artery aneurysm, 2.1 cm and right renal artery aneurysm, 1.1 cm.    Given the stability of the size and her age we will continue with conservative therapy.    She is to return in 1 year of the for follow-up duplex examination.       Wilner Reyez MD  Professor & Chief, Division of Vascular Surgery & Endovascular Therapy  , Vascular Residency & Fellowship Training Programs  [facility] [facility] [place]  [facility] [place]  "

## 2024-03-18 ENCOUNTER — HOSPITAL ENCOUNTER (OUTPATIENT)
Dept: CARDIOLOGY | Facility: CLINIC | Age: 69
Discharge: HOME | End: 2024-03-18
Payer: COMMERCIAL

## 2024-03-18 DIAGNOSIS — I47.29 OTHER VENTRICULAR TACHYCARDIA (MULTI): ICD-10-CM

## 2024-03-18 DIAGNOSIS — Z95.810 PRESENCE OF AUTOMATIC (IMPLANTABLE) CARDIAC DEFIBRILLATOR: ICD-10-CM

## 2024-03-18 PROCEDURE — 93296 REM INTERROG EVL PM/IDS: CPT

## 2024-03-18 PROCEDURE — 93295 DEV INTERROG REMOTE 1/2/MLT: CPT | Performed by: INTERNAL MEDICINE

## 2024-05-08 DIAGNOSIS — E78.5 HYPERLIPIDEMIA, UNSPECIFIED: Primary | ICD-10-CM

## 2024-05-08 DIAGNOSIS — I50.9 HEART FAILURE, UNSPECIFIED (MULTI): ICD-10-CM

## 2024-05-08 RX ORDER — ATORVASTATIN CALCIUM 80 MG/1
80 TABLET, FILM COATED ORAL NIGHTLY
Qty: 90 TABLET | Refills: 3 | Status: SHIPPED | OUTPATIENT
Start: 2024-05-08

## 2024-05-08 RX ORDER — FUROSEMIDE 20 MG/1
20 TABLET ORAL DAILY
Qty: 90 TABLET | Refills: 3 | Status: SHIPPED | OUTPATIENT
Start: 2024-05-08

## 2024-05-29 DIAGNOSIS — I50.9 HEART FAILURE, UNSPECIFIED (MULTI): Primary | ICD-10-CM

## 2024-05-30 ENCOUNTER — OFFICE VISIT (OUTPATIENT)
Dept: OBSTETRICS AND GYNECOLOGY | Facility: CLINIC | Age: 69
End: 2024-05-30
Payer: COMMERCIAL

## 2024-05-30 VITALS
DIASTOLIC BLOOD PRESSURE: 59 MMHG | BODY MASS INDEX: 36.48 KG/M2 | WEIGHT: 227 LBS | HEIGHT: 66 IN | SYSTOLIC BLOOD PRESSURE: 100 MMHG

## 2024-05-30 DIAGNOSIS — Z12.31 VISIT FOR SCREENING MAMMOGRAM: ICD-10-CM

## 2024-05-30 DIAGNOSIS — Z01.419 WELL WOMAN EXAM WITH ROUTINE GYNECOLOGICAL EXAM: Primary | ICD-10-CM

## 2024-05-30 PROCEDURE — 1160F RVW MEDS BY RX/DR IN RCRD: CPT | Performed by: OBSTETRICS & GYNECOLOGY

## 2024-05-30 PROCEDURE — 1159F MED LIST DOCD IN RCRD: CPT | Performed by: OBSTETRICS & GYNECOLOGY

## 2024-05-30 PROCEDURE — 3074F SYST BP LT 130 MM HG: CPT | Performed by: OBSTETRICS & GYNECOLOGY

## 2024-05-30 PROCEDURE — 99397 PER PM REEVAL EST PAT 65+ YR: CPT | Performed by: OBSTETRICS & GYNECOLOGY

## 2024-05-30 PROCEDURE — 3078F DIAST BP <80 MM HG: CPT | Performed by: OBSTETRICS & GYNECOLOGY

## 2024-05-30 PROCEDURE — 1036F TOBACCO NON-USER: CPT | Performed by: OBSTETRICS & GYNECOLOGY

## 2024-05-30 RX ORDER — SACUBITRIL AND VALSARTAN 97; 103 MG/1; MG/1
1 TABLET, FILM COATED ORAL 2 TIMES DAILY
Qty: 180 TABLET | Refills: 3 | Status: SHIPPED | OUTPATIENT
Start: 2024-05-30

## 2024-05-30 NOTE — PROGRESS NOTES
"Ana Cavanaugh is a 68 y.o. female who is here for a annual exam.     Patient denies any vaginal bleeding    Menopause symptoms: Having symptoms but is managing    Sexually active: Postmenopausal  Active no concerns    Regular self breast exam: yes  no concerns on her exams    Menstrual History:  OB History          6    Para   5    Term                AB   1    Living   5         SAB        IAB        Ectopic   1    Multiple        Live Births                    Patient's last menstrual period was 12/10/2021.         Review of Systems  All Normal Review of Systems  Constitutional: no fever, no chills, no recent weight gain, no recent weight loss and no fatigue.    Gastrointestinal: no abdominal pain, no constipation, no nausea, no diarrhea and no vomiting.    Genitourinary: no dysuria, no urinary incontinence, no vaginal dryness, no vaginal itching, no dyspareunia, no pelvic pain, no dysmenorrhea, no sexual problems, no change in urinary frequency, no vaginal discharge, no unexplained vaginal bleeding and no lesion/sore.    Breasts: No masses.  No nipple discharge.  No redness    Objective   /59   Ht 1.676 m (5' 6\")   Wt 103 kg (227 lb)   LMP 12/10/2021   BMI 36.64 kg/m²     OBGyn Exam   Physical Exam  Constitutional: Alert and in no acute distress. Well developed, well nourished.   Head and Face: Head and face: Normal.    Eyes: Normal external exam - nonicteric sclera, extraocular movements intact (EOMI) and no ptosis.   Ears, Nose, Mouth, and Throat: External inspection of ears and nose: Normal.    Neck: No neck asymmetry. Supple. Thyroid not enlarged and there were no palpable thyroid nodules.   Chest: Breasts: Normal appearance, no nipple discharge and no skin changes. Palpation of breasts and axillae: No palpable mass and no axillary lymphadenopathy.   Abdomen: Soft nontender; no abdominal mass palpated. No organomegaly. No hernias.     Genitourinary:   External genitalia: Normal. No " inguinal lymphadenopathy. Bartholin's Urethral and Skenes Glands: Normal. Urethra: Normal.    Bladder: Normal on palpation.   Vagina: Normal.   Cervix: Normal.    Uterus: Normal.    Right Adnexa/parametria: Normal.  Left Adnexa/parametria: Normal.    Inspection of Perianal Area: Normal.     Musculoskeletal: No joint swelling seen, normal movements of all extremities.   Skin: Normal skin color and pigmentation, normal skin turgor, and no rash.   Neurologic: Non-focal. Grossly intact.   Psychiatric: Alert and oriented x 3. Affect normal to patient baseline. Mood: Appropriate.      Assessment/Plan   1) Annual exam:  Normal exam today  Mammogram order placed as she is due in December    Thank you again for your visit to our office today  Please follow-up as needed or in 1 year with your annual exam

## 2024-06-26 ENCOUNTER — APPOINTMENT (OUTPATIENT)
Dept: PRIMARY CARE | Facility: CLINIC | Age: 69
End: 2024-06-26
Payer: COMMERCIAL

## 2024-06-26 PROBLEM — M50.90 DISORDER OF INTERVERTEBRAL DISC OF CERVICAL SPINE: Status: ACTIVE | Noted: 2023-03-30

## 2024-06-26 PROBLEM — H43.813 POSTERIOR VITREOUS DETACHMENT OF BOTH EYES: Status: ACTIVE | Noted: 2021-11-17

## 2024-06-26 PROBLEM — M54.30 SCIATICA: Status: ACTIVE | Noted: 2023-03-30

## 2024-06-26 PROBLEM — I47.29 OTHER VENTRICULAR TACHYCARDIA (MULTI): Status: ACTIVE | Noted: 2023-03-30

## 2024-06-26 PROBLEM — T17.908A ASPIRATION INTO RESPIRATORY TRACT: Status: ACTIVE | Noted: 2023-03-30

## 2024-06-26 PROBLEM — I50.9 CONGESTIVE HEART FAILURE (MULTI): Status: ACTIVE | Noted: 2022-09-26

## 2024-06-26 PROBLEM — K55.9: Status: ACTIVE | Noted: 2024-06-26

## 2024-06-26 PROBLEM — G43.909 MIGRAINE HEADACHE: Status: ACTIVE | Noted: 2022-07-07

## 2024-06-26 PROBLEM — H25.10 NUCLEAR SENILE CATARACT: Status: ACTIVE | Noted: 2021-11-17

## 2024-06-26 PROBLEM — M25.669 KNEE STIFFNESS: Status: ACTIVE | Noted: 2023-10-03

## 2024-06-26 PROBLEM — I21.9 MYOCARDIAL INFARCTION IN RECOVERY PHASE (MULTI): Status: ACTIVE | Noted: 2023-03-30

## 2024-06-26 PROBLEM — N95.0 POSTMENOPAUSAL BLEEDING: Status: ACTIVE | Noted: 2024-06-26

## 2024-06-26 RX ORDER — POTASSIUM CHLORIDE 750 MG/1
1 TABLET, EXTENDED RELEASE ORAL
COMMUNITY
Start: 2024-05-08

## 2024-06-26 RX ORDER — NAPROXEN SODIUM 220 MG/1
1 TABLET, FILM COATED ORAL
COMMUNITY
Start: 2024-05-08

## 2024-06-26 NOTE — PROGRESS NOTES
Ana Cavanaugh is a 68 y.o. female seen in Clinic at [facility] [place] [place] by Dr. Cuauhtemoc Herring on 06/26/24 for routine care, as well as for management of the following chronic medical conditions: ***. Patient is accompanied to this visit by ***.    HPI:   Acute Concerns:   ***    ROS:   ***    Chronic Medical Conditions:   ***    Past Medical History: ***  No past medical history on file.  Subspecialty Medical Care: ***  Interval Subspecialty Care Visit History: ***    Past Surgical History: ***  Past Surgical History:   Procedure Laterality Date    CT ABDOMEN PELVIS ANGIOGRAM W AND/OR WO IV CONTRAST  10/28/2021    CT ABDOMEN PELVIS ANGIOGRAM W AND/OR WO IV CONTRAST 10/28/2021 [facility] ANCILLARY LEGACY    CT ABDOMEN PELVIS ANGIOGRAM W AND/OR WO IV CONTRAST  03/18/2022    CT ABDOMEN PELVIS ANGIOGRAM W AND/OR WO IV CONTRAST 3/18/2022 [facility] ANCILLARY LEGACY    CT ABDOMEN PELVIS ANGIOGRAM W AND/OR WO IV CONTRAST  03/17/2023    CT ABDOMEN PELVIS ANGIOGRAM W AND/OR WO IV CONTRAST [facility] CT    CT ANGIO CORONARY ART WITH HEARTFLOW IF SCORE >30%  11/12/2020    CT HEART CORONARY ANGIOGRAM 11/12/2020 [facility] AIB LEGACY    CT GUIDED PERCUTANEOUS BIOPSY LUNG  05/25/2023    CT GUIDED PERCUTANEOUS BIOPSY LUNG 5/25/2023 [facility]    DILATION AND CURETTAGE OF UTERUS      ECTOPIC PREGNANCY SURGERY      salpingectomy    MITRAL VALVE REPAIR      MR HEAD ANGIO WO IV CONTRAST  01/14/2022    MR HEAD ANGIO WO IV CONTRAST 1/14/2022 [facility] ANCILLARY LEGACY    MR NECK ANGIO W AND WO IV CONTRAST  01/14/2022    MR NECK ANGIO W AND WO IV CONTRAST 1/14/2022 [facility] ANCILLARY LEGACY    TRICUSPID VALVE REPLACEMENT      UMBILICAL HERNIA REPAIR  03/17/2015    Umbilical Hernia Repair     Surgery/Location/Date: ***    Medications: *** (MVI, Vitamin D)    Current Outpatient Medications:     aspirin 81 mg chewable tablet, Chew 1 tablet (81 mg) early in the morning.., Disp: , Rfl:     Klor-Con M10 10 mEq ER tablet, Take 1 tablet (10 mEq) by mouth early in the morning.., Disp: , Rfl:      acetaminophen (Acetaminophen Extra Strength) 500 mg tablet, Take 2 tablets (1,000 mg) by mouth every 6 hours if needed., Disp: , Rfl:     aspirin 81 mg EC tablet, Take 1 tablet (81 mg) by mouth once daily., Disp: , Rfl:     atorvastatin (Lipitor) 80 mg tablet, TAKE 1 TABLET BY MOUTH EVERYDAY AT BEDTIME, Disp: 90 tablet, Rfl: 3    cholecalciferol (Vitamin D-3) 25 MCG (1000 UT) capsule, Take by mouth once daily., Disp: , Rfl:     dapagliflozin (Farxiga) 10 mg, Take 1 tablet (10 mg) by mouth once daily., Disp: , Rfl:     Entresto  mg tablet, TAKE 1 TABLET BY MOUTH TWICE A DAY, Disp: 180 tablet, Rfl: 3    furosemide (Lasix) 20 mg tablet, TAKE 1 TABLET BY MOUTH EVERY DAY, Disp: 90 tablet, Rfl: 3    metoprolol succinate XL (Toprol-XL) 100 mg 24 hr tablet, TAKE 1 TABLET (100 MG) IN AM AND 1/2 TABLET (50 MG) IN PM, Disp: 135 tablet, Rfl: 3    multivitamin-Ca-iron-minerals (Tab-A-Michelle Womens) 27-0.4 mg tablet, Take 1 tablet by mouth once daily., Disp: , Rfl:     multivitamin-min-iron-FA-vit K (Adults Multivitamin) 18 mg iron-400 mcg-25 mcg tablet, Take 1 tablet by mouth once daily., Disp: , Rfl:     omeprazole (PriLOSEC) 20 mg DR capsule, TAKE 1 CAPSULE BY MOUTH EVERY DAY, Disp: 90 capsule, Rfl: 3    spironolactone (Aldactone) 25 mg tablet, Take 1 tablet (25 mg) by mouth once daily., Disp: , Rfl:   Pharmacy: ***    Allergies: ***  Allergies   Allergen Reactions    Hay Fever And Allergy Relief Itching and Runny nose     Reactions: ***    Immunizations: ***    Family History: ***  Family History   Problem Relation Name Age of Onset    Other (Cardiac disorder) Mother      Osteoporosis Mother      Other (Hepatic cirrhosis) Father      Breast cancer Sister      Pulmonary fibrosis Sister      Thyroid disease Daughter      Other (Bleeding from varicose vein) Paternal Grandmother         Social History:   Home/Living Situation/Falls/Safety Assessment: ***  Education/Employment/Work/Vocational: ***  Activities: ***  Drug Use:  ***  Diet: ***  Depression/Anxiety: ***  Sexuality/Contraception/Menstrual History: ***  Sleep: ***    Patient Information:  Health Insurance: ***  Transportation: ***  Healthcare POA/Guardian: ***  Contact Information: ***  Other: ***    Visit Vitals  LMP 12/10/2021   OB Status Postmenopausal   Smoking Status Former        PHYSICAL EXAM: ***  General: well appearing, NAD, pleasant and engaged in encounter    HEENT: NCAT, MMM  CV: RRR, no m/r/g  PULM: CTAB, non-labored respirations   ABD: soft, NT, ND, + bowel sounds   : no suprapubic or CVA tenderness   EXT: WWP, no significant edema   SKIN: no rashes noted   NEURO: A&Ox4, symmetric facies, no gross motor or sensory deficits, normal gait  PSYCH: pleasant mood, appropriate affect     Assessment/Plan    Ana Cavanaugh is a 68 y.o. *** seen in Clinic at [facility] [place] by Dr. Cuauhtemoc Herring on 06/26/24 for routine care, as well as for management of the following chronic medical conditions: ***.     There are no diagnoses linked to this encounter.     #Chronic Medical Conditions  ***    #Health Maintenance    Cancer Screening  - Cervical Cancer Screening: last pap smear/HPV testing ***  - Mammography: ***  - Colorectal Cancer Screening: ***  - Lung Cancer Screening: ***  - Prostate Cancer Screening: ***    Laboratory Screening  - Lipid Screen: ***  - ASCVD Score: ***  - A1C, glucose screen: ***  - STI, HIV, Hep B screen: ***  - Hep C screen: ***    Imaging Screening  - AAA screening: ***  - Osteoporosis/DEXA screening: ***    Immunizations: ***  - Influenza  - COVID  - Tdap  - Menactra, Men B  - HPV  - Prevnar, Pneumovax  - Shingrix    Other Screening  - Health Literacy Assessment: ***  - Depression screen: ***  - Home safety/partner violence screen: ***  - Hearing/Vision screens: ***  - Alcohol/tobacco/drug use screen: ***  - Healthcare POA/Advanced Directives: ***    Referrals: ***  Return to clinic in *** for follow-up.    Patient Discussion:    Please call back the office  with any questions at [phone]. In the case of an emergency, please call 911 or go to the nearest Emergency Department.      Cuauhtemoc Herring MD  Internal Medicine-Pediatrics  [facility] [address], [address]  P: [phone], F: [phone]

## 2024-07-10 DIAGNOSIS — I50.22 CHRONIC SYSTOLIC CONGESTIVE HEART FAILURE (MULTI): Primary | ICD-10-CM

## 2024-07-10 RX ORDER — DAPAGLIFLOZIN 10 MG/1
10 TABLET, FILM COATED ORAL DAILY
Qty: 90 TABLET | Refills: 3 | Status: SHIPPED | OUTPATIENT
Start: 2024-07-10 | End: 2025-07-10

## 2024-08-04 DIAGNOSIS — I10 HYPERTENSION, UNSPECIFIED TYPE: ICD-10-CM

## 2024-08-04 DIAGNOSIS — I50.22 CHRONIC SYSTOLIC CONGESTIVE HEART FAILURE (MULTI): Primary | ICD-10-CM

## 2024-08-04 DIAGNOSIS — Z00.00 ENCOUNTER FOR GENERAL ADULT MEDICAL EXAMINATION WITHOUT ABNORMAL FINDINGS: ICD-10-CM

## 2024-08-05 DIAGNOSIS — I50.9 HEART FAILURE, UNSPECIFIED (MULTI): ICD-10-CM

## 2024-08-05 DIAGNOSIS — I50.22 CHRONIC SYSTOLIC CONGESTIVE HEART FAILURE (MULTI): Primary | ICD-10-CM

## 2024-08-06 ENCOUNTER — OFFICE VISIT (OUTPATIENT)
Dept: CARDIOLOGY | Facility: HOSPITAL | Age: 69
End: 2024-08-06
Payer: COMMERCIAL

## 2024-08-06 ENCOUNTER — LAB (OUTPATIENT)
Dept: LAB | Facility: LAB | Age: 69
End: 2024-08-06
Payer: COMMERCIAL

## 2024-08-06 ENCOUNTER — HOSPITAL ENCOUNTER (OUTPATIENT)
Dept: CARDIOLOGY | Facility: HOSPITAL | Age: 69
Discharge: HOME | End: 2024-08-06
Payer: COMMERCIAL

## 2024-08-06 VITALS
SYSTOLIC BLOOD PRESSURE: 97 MMHG | DIASTOLIC BLOOD PRESSURE: 60 MMHG | HEART RATE: 55 BPM | WEIGHT: 230 LBS | BODY MASS INDEX: 37.12 KG/M2

## 2024-08-06 DIAGNOSIS — Z98.890 H/O MITRAL VALVE REPAIR: ICD-10-CM

## 2024-08-06 DIAGNOSIS — I34.0 MITRAL VALVE INSUFFICIENCY, UNSPECIFIED ETIOLOGY: ICD-10-CM

## 2024-08-06 DIAGNOSIS — E78.5 DYSLIPIDEMIA: ICD-10-CM

## 2024-08-06 DIAGNOSIS — I42.9 CARDIOMYOPATHY, UNSPECIFIED TYPE (MULTI): Primary | ICD-10-CM

## 2024-08-06 DIAGNOSIS — I50.22 CHRONIC SYSTOLIC CONGESTIVE HEART FAILURE (MULTI): ICD-10-CM

## 2024-08-06 DIAGNOSIS — I42.9 CARDIOMYOPATHY, UNSPECIFIED TYPE (MULTI): ICD-10-CM

## 2024-08-06 DIAGNOSIS — R73.01 IMPAIRED FASTING GLUCOSE: ICD-10-CM

## 2024-08-06 DIAGNOSIS — I25.10 CORONARY ARTERY DISEASE INVOLVING NATIVE CORONARY ARTERY OF NATIVE HEART WITHOUT ANGINA PECTORIS: ICD-10-CM

## 2024-08-06 DIAGNOSIS — Z98.890 HISTORY OF MITRAL VALVE REPAIR: ICD-10-CM

## 2024-08-06 DIAGNOSIS — I25.42 DISSECTION OF CORONARY ARTERY: Primary | ICD-10-CM

## 2024-08-06 DIAGNOSIS — I10 PRIMARY HYPERTENSION: ICD-10-CM

## 2024-08-06 DIAGNOSIS — I48.92 ATRIAL FLUTTER, UNSPECIFIED TYPE (MULTI): ICD-10-CM

## 2024-08-06 LAB
ALBUMIN SERPL BCP-MCNC: 4.1 G/DL (ref 3.4–5)
ALP SERPL-CCNC: 76 U/L (ref 33–136)
ALT SERPL W P-5'-P-CCNC: 26 U/L (ref 7–45)
ANION GAP SERPL CALC-SCNC: 10 MMOL/L (ref 10–20)
AORTIC VALVE MEAN GRADIENT: 9 MMHG
AORTIC VALVE PEAK VELOCITY: 2 M/S
AST SERPL W P-5'-P-CCNC: 30 U/L (ref 9–39)
ATRIAL RATE: 55 BPM
AV PEAK GRADIENT: 16 MMHG
AVA (PEAK VEL): 1 CM2
AVA (VTI): 1.03 CM2
BILIRUB SERPL-MCNC: 0.5 MG/DL (ref 0–1.2)
BUN SERPL-MCNC: 16 MG/DL (ref 6–23)
CALCIUM SERPL-MCNC: 8.8 MG/DL (ref 8.6–10.3)
CHLORIDE SERPL-SCNC: 105 MMOL/L (ref 98–107)
CHOLEST SERPL-MCNC: 114 MG/DL (ref 0–199)
CHOLESTEROL/HDL RATIO: 2.5
CO2 SERPL-SCNC: 28 MMOL/L (ref 21–32)
CREAT SERPL-MCNC: 0.86 MG/DL (ref 0.5–1.05)
EGFRCR SERPLBLD CKD-EPI 2021: 75 ML/MIN/1.73M*2
EJECTION FRACTION APICAL 4 CHAMBER: 55.6
EJECTION FRACTION: 45 %
ERYTHROCYTE [DISTWIDTH] IN BLOOD BY AUTOMATED COUNT: 13.5 % (ref 11.5–14.5)
EST. AVERAGE GLUCOSE BLD GHB EST-MCNC: 120 MG/DL
GLUCOSE SERPL-MCNC: 93 MG/DL (ref 74–99)
HBA1C MFR BLD: 5.8 %
HCT VFR BLD AUTO: 35 % (ref 36–46)
HDLC SERPL-MCNC: 45.1 MG/DL
HGB BLD-MCNC: 11.5 G/DL (ref 12–16)
LDLC SERPL CALC-MCNC: 53 MG/DL
LEFT ATRIUM VOLUME AREA LENGTH INDEX BSA: 37.7 ML/M2
LEFT VENTRICLE INTERNAL DIMENSION DIASTOLE: 5.6 CM (ref 3.5–6)
LEFT VENTRICULAR OUTFLOW TRACT DIAMETER: 2 CM
MCH RBC QN AUTO: 31.6 PG (ref 26–34)
MCHC RBC AUTO-ENTMCNC: 32.9 G/DL (ref 32–36)
MCV RBC AUTO: 96 FL (ref 80–100)
MITRAL VALVE E/A RATIO: 1.54
MITRAL VALVE E/E' RATIO: 16.8
NON HDL CHOLESTEROL: 69 MG/DL (ref 0–149)
NRBC BLD-RTO: 0 /100 WBCS (ref 0–0)
P AXIS: 53 DEGREES
P OFFSET: 202 MS
P ONSET: 141 MS
PLATELET # BLD AUTO: 170 X10*3/UL (ref 150–450)
POTASSIUM SERPL-SCNC: 5.1 MMOL/L (ref 3.5–5.3)
PR INTERVAL: 166 MS
PROT SERPL-MCNC: 7.2 G/DL (ref 6.4–8.2)
Q ONSET: 224 MS
QRS COUNT: 9 BEATS
QRS DURATION: 84 MS
QT INTERVAL: 446 MS
QTC CALCULATION(BAZETT): 426 MS
QTC FREDERICIA: 433 MS
R AXIS: -20 DEGREES
RBC # BLD AUTO: 3.64 X10*6/UL (ref 4–5.2)
RIGHT VENTRICLE PEAK SYSTOLIC PRESSURE: 25.5 MMHG
SODIUM SERPL-SCNC: 138 MMOL/L (ref 136–145)
T AXIS: 49 DEGREES
T OFFSET: 447 MS
TRICUSPID ANNULAR PLANE SYSTOLIC EXCURSION: 1.5 CM
TRIGL SERPL-MCNC: 79 MG/DL (ref 0–149)
VENTRICULAR RATE: 55 BPM
VLDL: 16 MG/DL (ref 0–40)
WBC # BLD AUTO: 5 X10*3/UL (ref 4.4–11.3)

## 2024-08-06 PROCEDURE — 36415 COLL VENOUS BLD VENIPUNCTURE: CPT

## 2024-08-06 PROCEDURE — 93306 TTE W/DOPPLER COMPLETE: CPT | Performed by: INTERNAL MEDICINE

## 2024-08-06 PROCEDURE — 99214 OFFICE O/P EST MOD 30 MIN: CPT | Mod: 25 | Performed by: INTERNAL MEDICINE

## 2024-08-06 PROCEDURE — G2211 COMPLEX E/M VISIT ADD ON: HCPCS | Performed by: INTERNAL MEDICINE

## 2024-08-06 PROCEDURE — 85027 COMPLETE CBC AUTOMATED: CPT

## 2024-08-06 PROCEDURE — 93005 ELECTROCARDIOGRAM TRACING: CPT | Performed by: INTERNAL MEDICINE

## 2024-08-06 PROCEDURE — 3078F DIAST BP <80 MM HG: CPT | Performed by: INTERNAL MEDICINE

## 2024-08-06 PROCEDURE — 1160F RVW MEDS BY RX/DR IN RCRD: CPT | Performed by: INTERNAL MEDICINE

## 2024-08-06 PROCEDURE — 99214 OFFICE O/P EST MOD 30 MIN: CPT | Performed by: INTERNAL MEDICINE

## 2024-08-06 PROCEDURE — 93306 TTE W/DOPPLER COMPLETE: CPT

## 2024-08-06 PROCEDURE — 80053 COMPREHEN METABOLIC PANEL: CPT

## 2024-08-06 PROCEDURE — 1159F MED LIST DOCD IN RCRD: CPT | Performed by: INTERNAL MEDICINE

## 2024-08-06 PROCEDURE — 3074F SYST BP LT 130 MM HG: CPT | Performed by: INTERNAL MEDICINE

## 2024-08-06 PROCEDURE — 83036 HEMOGLOBIN GLYCOSYLATED A1C: CPT

## 2024-08-06 PROCEDURE — 1036F TOBACCO NON-USER: CPT | Performed by: INTERNAL MEDICINE

## 2024-08-06 PROCEDURE — 80061 LIPID PANEL: CPT

## 2024-08-06 RX ORDER — ASPIRIN 81 MG/1
81 TABLET ORAL DAILY
Qty: 90 TABLET | Refills: 3 | Status: SHIPPED | OUTPATIENT
Start: 2024-08-06 | End: 2025-08-06

## 2024-08-06 RX ORDER — SPIRONOLACTONE 25 MG/1
25 TABLET ORAL DAILY
Qty: 90 TABLET | Refills: 3 | Status: SHIPPED | OUTPATIENT
Start: 2024-08-06

## 2024-08-06 RX ORDER — POTASSIUM CHLORIDE 750 MG/1
10 TABLET, EXTENDED RELEASE ORAL DAILY
Qty: 90 TABLET | Refills: 3 | Status: SHIPPED | OUTPATIENT
Start: 2024-08-06

## 2024-08-06 ASSESSMENT — ENCOUNTER SYMPTOMS
DEPRESSION: 0
OCCASIONAL FEELINGS OF UNSTEADINESS: 0
LOSS OF SENSATION IN FEET: 0

## 2024-08-06 NOTE — RESULT ENCOUNTER NOTE
Stable mild anemia.  Lipids are at goal.  Normal CMP with high normal potassium.  Stop taking supplemental potassium.  Recheck a BMP in 3 months.

## 2024-08-06 NOTE — PROGRESS NOTES
Primary Care Physician: Harsha Chu MD  Date of Visit: 08/06/2024 11:40 AM EDT  Location of visit: [facility]     Chief Complaint:   No chief complaint on file.       HPI / Summary:   Ana Cavanaugh is a 68 y.o. female presents for followup.  She has no cardiac complaints.  She is physically active and exercises regularly walking for 1 hour 3 days a week and doing a cardio class I hour twice a week.  The patient denies chest pain, shortness of breath, palpitations, lightheadedness, syncope, orthopnea, paroxysmal nocturnal dyspnea, lower extremity edema, or bleeding problems.          History reviewed. No pertinent past medical history.     Past Surgical History:   Procedure Laterality Date    CT ABDOMEN PELVIS ANGIOGRAM W AND/OR WO IV CONTRAST  10/28/2021    CT ABDOMEN PELVIS ANGIOGRAM W AND/OR WO IV CONTRAST 10/28/2021 [facility] ANCILLARY LEGACY    CT ABDOMEN PELVIS ANGIOGRAM W AND/OR WO IV CONTRAST  03/18/2022    CT ABDOMEN PELVIS ANGIOGRAM W AND/OR WO IV CONTRAST 3/18/2022 [facility] ANCILLARY LEGACY    CT ABDOMEN PELVIS ANGIOGRAM W AND/OR WO IV CONTRAST  03/17/2023    CT ABDOMEN PELVIS ANGIOGRAM W AND/OR WO IV CONTRAST [facility] CT    CT ANGIO CORONARY ART WITH HEARTFLOW IF SCORE >30%  11/12/2020    CT HEART CORONARY ANGIOGRAM 11/12/2020 [facility] AIB LEGACY    CT GUIDED PERCUTANEOUS BIOPSY LUNG  05/25/2023    CT GUIDED PERCUTANEOUS BIOPSY LUNG 5/25/2023 [facility]    DILATION AND CURETTAGE OF UTERUS      ECTOPIC PREGNANCY SURGERY      salpingectomy    MITRAL VALVE REPAIR      MR HEAD ANGIO WO IV CONTRAST  01/14/2022    MR HEAD ANGIO WO IV CONTRAST 1/14/2022 [facility] ANCILLARY LEGACY    MR NECK ANGIO W AND WO IV CONTRAST  01/14/2022    MR NECK ANGIO W AND WO IV CONTRAST 1/14/2022 [facility] ANCILLARY LEGACY    TRICUSPID VALVE REPLACEMENT      UMBILICAL HERNIA REPAIR  03/17/2015    Umbilical Hernia Repair          Social History:   reports that she quit smoking about 33 years ago. Her smoking use included cigarettes. She has never used  smokeless tobacco. She reports that she does not currently use alcohol. She reports that she does not use drugs.     Family History:  family history includes Bleeding from varicose vein in her paternal grandmother; Breast cancer in her sister; Cardiac disorder in her mother; Hepatic cirrhosis in her father; Osteoporosis in her mother; Pulmonary fibrosis in her sister; Thyroid disease in her daughter.      Allergies:  Allergies   Allergen Reactions    Hay Fever And Allergy Relief Itching and Runny nose       Outpatient Medications:  Current Outpatient Medications   Medication Instructions    acetaminophen (Acetaminophen Extra Strength) 500 mg tablet 2 tablets, oral, Every 6 hours PRN    aspirin 81 mg, oral, Daily    atorvastatin (LIPITOR) 80 mg, oral, Nightly    cholecalciferol (Vitamin D-3) 25 MCG (1000 UT) capsule oral, Daily    dapagliflozin propanediol (FARXIGA) 10 mg, oral, Daily    Entresto  mg tablet 1 tablet, oral, 2 times daily    furosemide (LASIX) 20 mg, oral, Daily    Klor-Con M10 10 mEq ER tablet 1 tablet, oral, Daily (0630)    metoprolol succinate XL (Toprol-XL) 100 mg 24 hr tablet TAKE 1 TABLET (100 MG) IN AM AND 1/2 TABLET (50 MG) IN PM    multivitamin-Ca-iron-minerals (Tab-A-Michelle Womens) 27-0.4 mg tablet 1 tablet, oral, Daily    multivitamin-min-iron-FA-vit K (Adults Multivitamin) 18 mg iron-400 mcg-25 mcg tablet 1 tablet, oral, Daily    omeprazole (PRILOSEC) 20 mg, oral, Daily    spironolactone (ALDACTONE) 25 mg, oral, Daily       Physical Exam:  Vitals:    08/06/24 1151   BP: 97/60   BP Location: Left arm   Patient Position: Sitting   BP Cuff Size: Adult   Pulse: 55   Weight: 104 kg (230 lb)     Wt Readings from Last 5 Encounters:   08/06/24 104 kg (230 lb)   05/30/24 103 kg (227 lb)   03/15/24 103 kg (227 lb)   01/17/24 103 kg (228 lb)   11/15/23 100 kg (221 lb)     Body mass index is 37.12 kg/m².   General: Well-developed well-nourished in no acute distress  HEENT: Normocephalic  atraumatic  Neck: Supple, JVP is normal negative hepatojugular reflux 2+ carotid pulses without bruit  Pulmonary: Normal respiratory effort, clear to auscultation  Cardiovascular: No right ventricular heave, normal S1 and S2, 1 out of 6 early peaking systolic ejection murmur no rubs or gallops  Abdomen: Soft nontender nondistended  Extremities: Warm without edema 2+ radial pulses bilaterally 2+ dorsalis pedis pulses bilaterally  Neurologic: Alert and oriented x3  Psychiatric: Normal mood and affect     Last Labs:  CMP:  Recent Labs     11/15/23  0946 08/17/23  0257 08/16/23  0517 08/02/23  1253 12/09/22  0854 09/27/22  0309    CANCELED 131* 139   < > 139   K 4.2 CANCELED 4.8 3.8   < > 4.1    CANCELED 100 105   < > 107   CO2 27 CANCELED 21 26   < > 24   ANIONGAP 11 CANCELED 15 8   < > 8   BUN 17 CANCELED 18 16   < > 15   CREATININE 0.72 CANCELED 1.01 0.8   < > 0.7   EGFR >90  --   --  82  --  97   GLUCOSE 97 CANCELED 119* 102*   < > 143*    < > = values in this interval not displayed.     Recent Labs     08/02/23  1253 12/09/22  0854 09/27/22  0309 09/26/22  1426 10/21/21  0935   ALBUMIN 4.3 4.1 3.6   < > 4.2   ALKPHOS 76 61  --   --  76   ALT 27 20  --   --  25   AST 27 23  --   --  26   BILITOT 0.5 0.9  --   --  1.0    < > = values in this interval not displayed.     CBC:  Recent Labs     11/15/23  0946 08/17/23  0257 08/16/23  0517   WBC 4.4 CANCELED 10.8   HGB 11.9* CANCELED 10.5*   HCT 35.8* CANCELED 32.1*    CANCELED 144*   MCV 94 CANCELED 94     COAG:   Recent Labs     08/02/23  1253 05/03/23  1429 03/07/23  0649 08/26/19 2006   INR 1.1 1.1   < > 1.1   DDIMERVTE  --   --   --  341    < > = values in this interval not displayed.     HEME/ENDO:  Recent Labs     10/19/23  0957 08/02/23  1438 10/21/21  0935 08/02/21  1059 05/30/21  0522 12/09/20  0815   FERRITIN  --   --   --  211*  --   --    IRONSAT  --   --   --  23*  --   --    TSH 1.82  --  2.43  --   --  2.72   HGBA1C  --  6.0 5.6  --   5.8  --       CARDIAC:   Recent Labs     08/06/21  1301 08/02/21  1059 05/29/21  1242 08/26/19 2006 05/23/19  2210   * 330*  --   --   --    BNP  --   --  195* 273* 413*     Recent Labs     11/15/23  0946 12/09/22  0854 10/21/21  0935 06/01/21  0502   CHOL 123 129 120 136   LDLF  --  51 48 71   LDLCALC 49  --   --   --    HDL 56.6 63.0 48.8 55.2   TRIG 86 75 116 49       Last Cardiology Tests:  ECG:  Electrocardiogram performed today that I reviewed shows sinus bradycardia inferior infarct T wave abnormality consider anterior lateral ischemia.    Echo:  Echocardiogram August 6, 2024  CONCLUSIONS:   1. Basal and mid inferior wall is abnormal.   2. Left ventricular ejection fraction is mildly decreased, by visual estimate at 45%.   3. Abnormal left venticular wall motion.   4. Spectral Doppler shows a pseudonormal pattern of left ventricular diastolic filling.   5. Abnormal septal motion consistent with post-operative status.   6. There is mildly reduced right ventricular systolic function.   7. The left atrium is mild to moderately dilated.   8. The right atrium is mild to moderately dilated.    Echocardiogram November 30, 2022  Echo Results:  11/30/22  CONCLUSIONS:   1. Left ventricular systolic function is mildly to moderately decreased with a 40% estimated ejection fraction.   2. Abnormal septal motion consistent with post-operative status.   3. Spectral Doppler shows a pseudonormal pattern of left ventricular diastolic filling.   4. The left atrium is severely dilated.   5. The right atrium is moderately dilated.   6. Mild to moderate mitral valve regurgitation.   7. Mild aortic valve regurgitation.   8. There is global hypokinesis of the left ventricle with minor regional variations.       Cath:  5/31/21  Coronary Lesion Summary:  Vessel   Stenosis    Vessel Segment  RCA    100% stenosis     distal   CONCLUSIONS:   1. Severe single-vessel coronary artery disease in a right dominant system.   2.  Unsuccessful distal RCA PCI due to failure to cross lesion with guidewire.   3. Overall normal left ventricular ejection fraction.    Stress Test:  Stress Results:  No results found for this or any previous visit from the past 365 days.         Cardiac Imaging:  Cardiac MRI January 2023  1. The left ventricle is enlarged with reduced systolic function.   LVEF 34%. No regional wall motion  abnormality.      2. Near transmural delayed enhancement involving the base and mid   inferoseptal and inferior walls in  keeping with sequela of prior infarction with limited viability in   this region. The rest of the myocardium  demonstrate normal delayed enhancement with no definite evidence of   scarring or infiltrative process.      3. Biatrial enlargement, left more than right. Moderate mitral   regurgitation. Mild aortic regurgitation.      4. Status post median sternotomy and apparent mitral and tricuspid   valve repair.     CT chest without contrast September 21, 2023  HEART AND VESSELS:   The thoracic aorta is of normal course and caliber without vascular   calcifications.      Main pulmonary artery and its branches are normal in caliber.      No coronary artery calcifications are seen. The study is not   optimized for evaluation of coronary arteries.      There is moderate biatrial enlargement. The patient is status post   tricuspid and mitral valve replacement. AICD device within the right   ventricle.       Assessment/Plan   Diagnoses and all orders for this visit:  Cardiomyopathy, unspecified type (Multi)  -     ECG 12 lead (Clinic Performed)  -     Hemoglobin A1C; Future  Atrial flutter, unspecified type (Multi)  Chronic systolic congestive heart failure (Multi)  -     Comprehensive Metabolic Panel; Future  -     CBC; Future  History of mitral valve repair  Primary hypertension  Dyslipidemia  -     Lipid Panel; Future  -     Hemoglobin A1C; Future  Coronary artery disease involving native coronary artery of native  heart without angina pectoris  -     Hemoglobin A1C; Future  Impaired fasting glucose  -     Hemoglobin A1C; Future    In summary Ms. Cavanaugh is a pleasant 68 year-old -American female with a past medical history significant for prior remote unspecified arrhythmia treated with digoxin, mitral and tricuspid valve regurgitation status post repair, postoperative atrial flutter status post internal closure of the left atrial appendage, chronic systolic and diastolic heart failure, non ischemic cardiomyopathy s/p ICD, coronary artery disease s/p NSTEMI 5/21 with unsuccessful PCI to occluded RCA (?scad), obesity, and [employer].  She is asymptomatic from a cardiac perspective.  She is euvolemic on exam.  Her echo today shows some improvement in her ejection fraction relative to her MRI.  She should continue her current cardiovascular medications.  I ordered labs as indicated below.  We will see her back in follow-up in 6 months.      Orders:  Orders Placed This Encounter   Procedures    Lipid Panel    Comprehensive Metabolic Panel    CBC    Hemoglobin A1C    ECG 12 lead (Clinic Performed)      Followup Appts:  Future Appointments   Date Time Provider Department Center   10/1/2024  1:00 PM Ellie Cavanaugh PA-C [id number] [facility]   11/26/2024  9:45 AM Harsha Chu MD [id number] [facility]   12/26/2024  8:15 AM [facility] [id number] MOBILE MAMMO 1 [id number] [place]   6/2/2025  8:45 AM Jean Pierre Jauregui MD [id number] [facility]           ____________________________________________________________  Jose Goodwin MD  [facility]  [facility]

## 2024-08-09 DIAGNOSIS — I10 PRIMARY HYPERTENSION: Primary | ICD-10-CM

## 2024-09-16 ENCOUNTER — HOSPITAL ENCOUNTER (OUTPATIENT)
Dept: CARDIOLOGY | Facility: CLINIC | Age: 69
Discharge: HOME | End: 2024-09-16
Payer: COMMERCIAL

## 2024-09-16 DIAGNOSIS — Z95.810 PRESENCE OF AUTOMATIC (IMPLANTABLE) CARDIAC DEFIBRILLATOR: ICD-10-CM

## 2024-09-16 DIAGNOSIS — I47.29 OTHER VENTRICULAR TACHYCARDIA: ICD-10-CM

## 2024-09-16 PROCEDURE — 93296 REM INTERROG EVL PM/IDS: CPT

## 2024-09-16 PROCEDURE — 93295 DEV INTERROG REMOTE 1/2/MLT: CPT | Performed by: INTERNAL MEDICINE

## 2024-10-01 ENCOUNTER — APPOINTMENT (OUTPATIENT)
Dept: ORTHOPEDIC SURGERY | Facility: CLINIC | Age: 69
End: 2024-10-01
Payer: COMMERCIAL

## 2024-10-03 ENCOUNTER — OFFICE VISIT (OUTPATIENT)
Dept: ORTHOPEDIC SURGERY | Facility: CLINIC | Age: 69
End: 2024-10-03
Payer: MEDICARE

## 2024-10-03 ENCOUNTER — HOSPITAL ENCOUNTER (OUTPATIENT)
Dept: RADIOLOGY | Facility: CLINIC | Age: 69
Discharge: HOME | End: 2024-10-03
Payer: MEDICARE

## 2024-10-03 DIAGNOSIS — Z96.651 AFTERCARE FOLLOWING RIGHT KNEE JOINT REPLACEMENT SURGERY: ICD-10-CM

## 2024-10-03 DIAGNOSIS — Z47.1 AFTERCARE FOLLOWING RIGHT KNEE JOINT REPLACEMENT SURGERY: ICD-10-CM

## 2024-10-03 PROCEDURE — 73562 X-RAY EXAM OF KNEE 3: CPT | Mod: RT

## 2024-10-03 PROCEDURE — 99214 OFFICE O/P EST MOD 30 MIN: CPT | Performed by: PHYSICIAN ASSISTANT

## 2024-10-03 PROCEDURE — 1159F MED LIST DOCD IN RCRD: CPT | Performed by: PHYSICIAN ASSISTANT

## 2024-10-03 ASSESSMENT — PAIN - FUNCTIONAL ASSESSMENT: PAIN_FUNCTIONAL_ASSESSMENT: NO/DENIES PAIN

## 2024-10-03 NOTE — PROGRESS NOTES
Ellie JARRET Cavanaugh, DIANE, PAGhadaC, ATC  Adult Reconstruction and Joint Replacement Surgery  Phone: [phone]     Fax:[phone]            Chief Complaint   Patient presents with    Right Knee - Follow-up       HPI:  Ana Cavanaugh is a pleasant 68 y.o. year-old female here for follow-up of their side: right total knee arthroplasty by Dr. Bermudez.  The patient is approximately 1 week(s) postop.The patient has no mechanical symptoms.  The patient has no swelling and pain.   The patients wound has healed uneventfully.  The patient has been doing HEP and/or outpatient PT.  No complications postoperatively.  The patient is very happy with the result of the operation.    Review of Systems  No past medical history on file.  Patient Active Problem List   Diagnosis    Abdominal pain    Age-related nuclear cataract of both eyes    Anemia    ASB (asymptomatic bacteriuria)    Aspiration into respiratory tract    Arthritis of knee, degenerative    Arthritis of knee, right    Ataxia    Atrial flutter (Multi)    Blood in urine    Bronchitis    Chest pain    Cardiac chest pain    Cardiomyopathy    Cervical disc disease    Disorder of intervertebral disc of cervical spine    Congestive heart failure    Disorder of connective tissue (Multi)    Condition not found    Degenerative arthritis of knee, bilateral    Primary osteoarthritis of right knee    History of mitral valve repair    Primary osteoarthritis of left knee    Dyslipidemia    Vitreous floaters    Increased frequency of urination    Gastroesophageal reflux disease    Heart murmur    Hypertension    Hypoxia    Reactive airway disease (HHS-HCC)    Left knee pain    Swelling of lower extremity    Leukocytosis    Lightheadedness    Migraine headache    Multinodular non-toxic goiter    Nausea and vomiting    Neck pain    Myocardial infarction in recovery phase (Multi)    Obesity    Ocular migraine    Palpitations    Pericardial effusion (HHS-HCC)    Posterior  vitreous detachment of both eyes    Right arm weakness    S/P TVR (tricuspid valve replacement)    Sciatica    Dyspnea on exertion    Shortness of breath    Renal artery aneurysm (CMS-HCC)    Aneurysm of splenic artery (CMS-HCC)    Dissection of coronary artery    History of open heart surgery    SVT (supraventricular tachycardia) (CMS-HCC)    Other ventricular tachycardia    Cyst of thyroid    Thyroid nodule    Mitral regurgitation    Tricuspid valve insufficiency    UTI (urinary tract infection)    Frequent PVCs    Weakness of left lower extremity    Dissecting aneurysm of renal artery (Multi)    Implantable cardioverter-defibrillator (ICD) in situ    Solitary pulmonary nodule    Arthralgia of right knee    Lipoma of skin and subcutaneous tissue    Cardiac arrhythmia    Acute diastolic congestive heart failure    Tear of meniscus of knee    Knee stiffness    Effusion, right knee    Nuclear senile cataract    Postmenopausal bleeding    Vascular disorder of intestine (Multi)     Medication Documentation Review Audit       Reviewed by Meseret Sevilla LPN (Licensed Nurse) on 10/03/24 at 1052      Medication Order Taking? Sig Documenting Provider Last Dose Status   acetaminophen (Acetaminophen Extra Strength) 500 mg tablet [id number] Yes Take 2 tablets (1,000 mg) by mouth every 6 hours if needed. Historical Provider, MD Taking Active   aspirin 81 mg EC tablet [id number] Yes Take 1 tablet (81 mg) by mouth once daily. ZIYAD Rapp Taking Active   atorvastatin (Lipitor) 80 mg tablet [id number] Yes TAKE 1 TABLET BY MOUTH EVERYDAY AT BEDTIME ZIYAD Rapp Taking Active   cholecalciferol (Vitamin D-3) 25 MCG (1000 UT) capsule [id number] Yes Take by mouth once daily. Historical Provider, MD Taking Active   dapagliflozin propanediol (Farxiga) 10 mg [id number] Yes Take 1 tablet (10 mg) by mouth once daily. ZIYAD Rapp Taking Active   Entresto  mg tablet [id number] Yes TAKE 1 TABLET BY MOUTH  TWICE A DAY ZIYAD Rapp Taking Active   furosemide (Lasix) 20 mg tablet [id number] Yes TAKE 1 TABLET BY MOUTH EVERY DAY ZIYAD Rapp Taking Active   Klor-Con M10 10 mEq ER tablet [id number] Yes TAKE 1 TABLET BY MOUTH EVERY DAY Jose Goodwin MD Taking Active   metoprolol succinate XL (Toprol-XL) 100 mg 24 hr tablet [id number] Yes TAKE 1 TABLET (100 MG) IN AM AND 1/2 TABLET (50 MG) IN PM GiniZIYAD Cutler Taking Active   multivitamin-Ca-iron-minerals (Tab-A-Michelle Womens) 27-0.4 mg tablet [id number] Yes Take 1 tablet by mouth once daily. Historical Provider, MD Taking Active   multivitamin-min-iron-FA-vit K (Adults Multivitamin) 18 mg iron-400 mcg-25 mcg tablet [id number] Yes Take 1 tablet by mouth once daily. Historical Provider, MD Taking Active   omeprazole (PriLOSEC) 20 mg DR capsule [id number] Yes TAKE 1 CAPSULE BY MOUTH EVERY DAY Jose Goodwin MD Taking Active   spironolactone (Aldactone) 25 mg tablet [id number] Yes TAKE 1 TABLET BY MOUTH EVERY DAY Jose Goodwin MD Taking Active                  Allergies   Allergen Reactions    Hay Fever And Allergy Relief Itching and Runny nose     Social History     Socioeconomic History    Marital status:      Spouse name: Not on file    Number of children: Not on file    Years of education: Not on file    Highest education level: Not on file   Occupational History    Not on file   Tobacco Use    Smoking status: Former     Current packs/day: 0.00     Types: Cigarettes     Quit date:      Years since quittin.7    Smokeless tobacco: Never   Substance and Sexual Activity    Alcohol use: Not Currently    Drug use: Never    Sexual activity: Not on file   Other Topics Concern    Not on file   Social History Narrative    Not on file     Social Determinants of Health     Financial Resource Strain: Not on file   Food Insecurity: Not on file   Transportation Needs: Not on file   Physical Activity: Not on file   Stress: Not on file    Social Connections: Not on file   Intimate Partner Violence: Not on file   Housing Stability: Not on file     Past Surgical History:   Procedure Laterality Date    CT ABDOMEN PELVIS ANGIOGRAM W AND/OR WO IV CONTRAST  10/28/2021    CT ABDOMEN PELVIS ANGIOGRAM W AND/OR WO IV CONTRAST 10/28/2021 [facility]    CT ABDOMEN PELVIS ANGIOGRAM W AND/OR WO IV CONTRAST  03/18/2022    CT ABDOMEN PELVIS ANGIOGRAM W AND/OR WO IV CONTRAST 3/18/2022 [facility]    CT ABDOMEN PELVIS ANGIOGRAM W AND/OR WO IV CONTRAST  03/17/2023    CT ABDOMEN PELVIS ANGIOGRAM W AND/OR WO IV CONTRAST [facility]    CT ANGIO CORONARY ART WITH HEARTFLOW IF SCORE >30%  11/12/2020    CT HEART CORONARY ANGIOGRAM 11/12/2020 [facility] AIB LEGACY    CT GUIDED PERCUTANEOUS BIOPSY LUNG  05/25/2023    CT GUIDED PERCUTANEOUS BIOPSY LUNG 5/25/2023 [facility]    DILATION AND CURETTAGE OF UTERUS      ECTOPIC PREGNANCY SURGERY      salpingectomy    MITRAL VALVE REPAIR      MR HEAD ANGIO WO IV CONTRAST  01/14/2022    MR HEAD ANGIO WO IV CONTRAST 1/14/2022 [facility] ANCILLARY LEGACY    MR NECK ANGIO W AND WO IV CONTRAST  01/14/2022    MR NECK ANGIO W AND WO IV CONTRAST 1/14/2022 [facility] ANCILLARY LEGACY    TRICUSPID VALVE REPLACEMENT      UMBILICAL HERNIA REPAIR  03/17/2015    Umbilical Hernia Repair       Physical Exam  side: right Knee  There were no vitals filed for this visit.  AxO x 3 in NAD.   Assistive Device: no device. Coordination and balance intact.  Normal bilateral upper and lower extremities.  No erythema, ecchymosis, temperature changes. No popliteal lymphadenopathy,  No overlying lesion  Mood: euthymic  Respirations non-labored  The incision is midline healing well with no signs of surrounding infection, dehiscence or drainage.   Neurovascular exam is at baseline.  No instability varus or valgus stressing the knee at 0, 30 or 60 degrees.  No instability in the AP plane at 90 degrees.  Range of motion: 0 degrees extension, 120 degrees flexion  5/5 hip  flexion/knee extension/DF/PF/EHL  SILT in darin/saph/ per/tib distribution   Extremities warm and well perfused.  No lower extremity calf tenderness, warmth or swelling. Lower extremity well perfused  2+ Femoral/DP/PT pulses bilaterally    Imaging:    I personally reviewed multiple views of the knee today in clinic. Status post side: right Total Knee Arthroplasty. The implant is well fixed, well aligned.  No evidence of xiomara-implant fracture, lucency or dislocation.    Impression/Plan:  Ana Cavanaugh is doing well post-operatively and happy with the results of the operation.     S/P side: right Total Knee Arthroplasty  I talked with patient at length about activity precautions and progression of activities. The patient understands their permanent precautions.   3. Discussed the importance of dental prophylactic dental antibiotics lifelong. Patient may request medication refill through MyChart,       Pharmacy or surgeons office.    All questions answered.    Follow-up 5 years with x-rays at next visit.    Ellie Cavanaugh, MPAS, PA-C, ATC  Orthopedic Physician Assisant  Adult Reconstruction and Total Joint Replacement  General Orthopedics  Department of Orthopaedic Surgery  [address] messaging preferred

## 2024-10-08 ENCOUNTER — APPOINTMENT (OUTPATIENT)
Dept: PRIMARY CARE | Facility: CLINIC | Age: 69
End: 2024-10-08
Payer: COMMERCIAL

## 2024-10-28 DIAGNOSIS — I50.22 CHRONIC SYSTOLIC CONGESTIVE HEART FAILURE: ICD-10-CM

## 2024-10-28 DIAGNOSIS — I42.9 CARDIOMYOPATHY, UNSPECIFIED TYPE (MULTI): ICD-10-CM

## 2024-10-29 RX ORDER — METOPROLOL SUCCINATE 100 MG/1
TABLET, EXTENDED RELEASE ORAL
Qty: 135 TABLET | Refills: 3 | Status: SHIPPED | OUTPATIENT
Start: 2024-10-29

## 2024-11-26 ENCOUNTER — APPOINTMENT (OUTPATIENT)
Dept: PRIMARY CARE | Facility: CLINIC | Age: 69
End: 2024-11-26
Payer: COMMERCIAL

## 2024-11-26 VITALS
HEART RATE: 53 BPM | TEMPERATURE: 98 F | HEIGHT: 62 IN | BODY MASS INDEX: 42.44 KG/M2 | RESPIRATION RATE: 18 BRPM | WEIGHT: 230.6 LBS | DIASTOLIC BLOOD PRESSURE: 66 MMHG | SYSTOLIC BLOOD PRESSURE: 97 MMHG

## 2024-11-26 DIAGNOSIS — Z11.59 NEED FOR HEPATITIS C SCREENING TEST: ICD-10-CM

## 2024-11-26 DIAGNOSIS — H91.93 HEARING DIFFICULTY OF BOTH EARS: ICD-10-CM

## 2024-11-26 DIAGNOSIS — Z00.00 HEALTHCARE MAINTENANCE: Primary | ICD-10-CM

## 2024-11-26 DIAGNOSIS — I72.2 RENAL ARTERY ANEURYSM (CMS-HCC): ICD-10-CM

## 2024-11-26 DIAGNOSIS — M79.642 BILATERAL HAND PAIN: ICD-10-CM

## 2024-11-26 DIAGNOSIS — I25.5 ISCHEMIC CARDIOMYOPATHY: ICD-10-CM

## 2024-11-26 DIAGNOSIS — M79.641 BILATERAL HAND PAIN: ICD-10-CM

## 2024-11-26 RX ORDER — GABAPENTIN 100 MG/1
100 CAPSULE ORAL 3 TIMES DAILY
Qty: 90 CAPSULE | Refills: 5 | Status: SHIPPED | OUTPATIENT
Start: 2024-11-26 | End: 2025-05-25

## 2024-11-26 ASSESSMENT — ENCOUNTER SYMPTOMS
MYALGIAS: 0
LOSS OF SENSATION IN FEET: 0
CHEST TIGHTNESS: 0
OCCASIONAL FEELINGS OF UNSTEADINESS: 0
ARTHRALGIAS: 1
CONSTIPATION: 0
SHORTNESS OF BREATH: 0
WEAKNESS: 0
DEPRESSION: 0
DIARRHEA: 0
PALPITATIONS: 0
NECK PAIN: 1
AGITATION: 0
CHILLS: 0
ACTIVITY CHANGE: 0

## 2024-11-26 ASSESSMENT — ACTIVITIES OF DAILY LIVING (ADL)
DOING_HOUSEWORK: INDEPENDENT
GROCERY_SHOPPING: INDEPENDENT
DRESSING: INDEPENDENT
BATHING: INDEPENDENT
MANAGING_FINANCES: INDEPENDENT
TAKING_MEDICATION: INDEPENDENT

## 2024-11-26 ASSESSMENT — PATIENT HEALTH QUESTIONNAIRE - PHQ9
2. FEELING DOWN, DEPRESSED OR HOPELESS: NOT AT ALL
SUM OF ALL RESPONSES TO PHQ9 QUESTIONS 1 AND 2: 0
1. LITTLE INTEREST OR PLEASURE IN DOING THINGS: NOT AT ALL

## 2024-11-26 ASSESSMENT — PAIN SCALES - GENERAL: PAINLEVEL_OUTOF10: 0-NO PAIN

## 2024-11-26 NOTE — ASSESSMENT & PLAN NOTE
Will try gabapentin  Orders:    gabapentin (Neurontin) 100 mg capsule; Take 1 capsule (100 mg) by mouth 3 times a day.
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
## 2024-12-05 ENCOUNTER — APPOINTMENT (OUTPATIENT)
Dept: AUDIOLOGY | Facility: CLINIC | Age: 69
End: 2024-12-05
Payer: COMMERCIAL

## 2024-12-07 LAB
ATRIAL RATE: 55 BPM
P AXIS: 53 DEGREES
P OFFSET: 202 MS
P ONSET: 141 MS
PR INTERVAL: 166 MS
Q ONSET: 224 MS
QRS COUNT: 9 BEATS
QRS DURATION: 84 MS
QT INTERVAL: 446 MS
QTC CALCULATION(BAZETT): 426 MS
QTC FREDERICIA: 433 MS
R AXIS: -20 DEGREES
T AXIS: 49 DEGREES
T OFFSET: 447 MS
VENTRICULAR RATE: 55 BPM

## 2024-12-17 ENCOUNTER — CLINICAL SUPPORT (OUTPATIENT)
Dept: AUDIOLOGY | Facility: CLINIC | Age: 69
End: 2024-12-17
Payer: COMMERCIAL

## 2024-12-17 DIAGNOSIS — H93.13 TINNITUS OF BOTH EARS: ICD-10-CM

## 2024-12-17 DIAGNOSIS — H90.3 SENSORINEURAL HEARING LOSS (SNHL) OF BOTH EARS: Primary | ICD-10-CM

## 2024-12-17 DIAGNOSIS — H91.93 HEARING DIFFICULTY OF BOTH EARS: ICD-10-CM

## 2024-12-17 PROCEDURE — 92550 TYMPANOMETRY & REFLEX THRESH: CPT | Mod: 52 | Performed by: AUDIOLOGIST

## 2024-12-17 PROCEDURE — 92557 COMPREHENSIVE HEARING TEST: CPT | Performed by: AUDIOLOGIST

## 2024-12-17 NOTE — PROGRESS NOTES
Chief Complaint   Patient presents with    Hearing Loss       HISTORY:  Ana Cavanaugh, age 68 years, was seen for audiogram on 12/17/2024 at the referral of Harsha Chu MD.  Ms. Cavanaugh states her friends and family tell her she has hearing loss.  She does ask for speech to be repeated.  There is no report of ear pain, ear pressure, middle ear pathology, ear drainage or ear surgery.  Ms. Cavanaugh does note periodic tinnitus both ears.  There is no family history of hearing loss.  Please see medical records for complete history.    RESULTS:  Prior to testing both external auditory canals were clear and tympanic membranes visualized    Immittance and acoustic reflexes:  Immittance testing yielded TYPE A tympanograms indicating normal middle ear function both ears  Acoustic reflexes were present 500 - 4000 Hz both ears    Audiogram:  Normal hearing levels were obtained 125 - 250 Hz with a mild sensorineural hearing loss 500 - 8000 Hz both ears  Speech reception thresholds obtained at 35 dBHL both ears  Speech discrimination scores were 100% at 60 dBHL      IMPRESSIONS:  Normal middle ear function noted both ears  Normal acoustic reflexes noted both ears  Mild sensorineural hearing loss both ears 500 -8000 Hz    Ms. Cavanaugh may benefit from binaural hearing aids.  She was provided two copies of her audiogram today.  She will contact her insurance companies to determine in network hearing aid benefits and hearing aid providers.    RECOMMENDATIONS:  1.  Follow up with referring provider  2.  Retest hearing levels annually    time: 800 - 900

## 2024-12-26 ENCOUNTER — LAB (OUTPATIENT)
Dept: LAB | Facility: LAB | Age: 69
End: 2024-12-26
Payer: COMMERCIAL

## 2024-12-26 ENCOUNTER — HOSPITAL ENCOUNTER (OUTPATIENT)
Dept: RADIOLOGY | Facility: CLINIC | Age: 69
Discharge: HOME | End: 2024-12-26
Payer: COMMERCIAL

## 2024-12-26 VITALS — WEIGHT: 225 LBS | BODY MASS INDEX: 36.16 KG/M2 | HEIGHT: 66 IN

## 2024-12-26 DIAGNOSIS — Z12.31 VISIT FOR SCREENING MAMMOGRAM: ICD-10-CM

## 2024-12-26 DIAGNOSIS — Z11.59 NEED FOR HEPATITIS C SCREENING TEST: ICD-10-CM

## 2024-12-26 DIAGNOSIS — I10 PRIMARY HYPERTENSION: ICD-10-CM

## 2024-12-26 LAB
ANION GAP SERPL CALC-SCNC: 11 MMOL/L (ref 10–20)
BUN SERPL-MCNC: 20 MG/DL (ref 6–23)
CALCIUM SERPL-MCNC: 9.8 MG/DL (ref 8.6–10.6)
CHLORIDE SERPL-SCNC: 106 MMOL/L (ref 98–107)
CO2 SERPL-SCNC: 29 MMOL/L (ref 21–32)
CREAT SERPL-MCNC: 0.92 MG/DL (ref 0.5–1.05)
EGFRCR SERPLBLD CKD-EPI 2021: 69 ML/MIN/1.73M*2
GLUCOSE SERPL-MCNC: 98 MG/DL (ref 74–99)
HCV AB SER QL: NONREACTIVE
POTASSIUM SERPL-SCNC: 4.6 MMOL/L (ref 3.5–5.3)
SODIUM SERPL-SCNC: 141 MMOL/L (ref 136–145)

## 2024-12-26 PROCEDURE — 86803 HEPATITIS C AB TEST: CPT

## 2024-12-26 PROCEDURE — 80048 BASIC METABOLIC PNL TOTAL CA: CPT

## 2024-12-26 PROCEDURE — 77067 SCR MAMMO BI INCL CAD: CPT

## 2025-01-29 DIAGNOSIS — K21.9 GASTRO-ESOPHAGEAL REFLUX DISEASE WITHOUT ESOPHAGITIS: ICD-10-CM

## 2025-01-29 RX ORDER — OMEPRAZOLE 20 MG/1
20 CAPSULE, DELAYED RELEASE ORAL DAILY
Qty: 90 CAPSULE | Refills: 3 | Status: SHIPPED | OUTPATIENT
Start: 2025-01-29 | End: 2026-01-29

## 2025-02-04 ENCOUNTER — APPOINTMENT (OUTPATIENT)
Dept: CARDIOLOGY | Facility: HOSPITAL | Age: 70
End: 2025-02-04
Payer: MEDICARE

## 2025-02-13 ENCOUNTER — OFFICE VISIT (OUTPATIENT)
Dept: CARDIOLOGY | Facility: HOSPITAL | Age: 70
End: 2025-02-13
Payer: MEDICARE

## 2025-02-13 VITALS
HEIGHT: 66 IN | HEART RATE: 63 BPM | DIASTOLIC BLOOD PRESSURE: 66 MMHG | BODY MASS INDEX: 36.8 KG/M2 | OXYGEN SATURATION: 96 % | SYSTOLIC BLOOD PRESSURE: 102 MMHG | WEIGHT: 229 LBS

## 2025-02-13 DIAGNOSIS — Z98.890 H/O MITRAL VALVE REPAIR: ICD-10-CM

## 2025-02-13 DIAGNOSIS — I25.10 CORONARY ARTERY DISEASE INVOLVING NATIVE CORONARY ARTERY OF NATIVE HEART WITHOUT ANGINA PECTORIS: ICD-10-CM

## 2025-02-13 DIAGNOSIS — E78.5 DYSLIPIDEMIA: ICD-10-CM

## 2025-02-13 DIAGNOSIS — I42.9 CARDIOMYOPATHY, UNSPECIFIED TYPE (MULTI): Primary | ICD-10-CM

## 2025-02-13 DIAGNOSIS — I50.22 CHRONIC SYSTOLIC CONGESTIVE HEART FAILURE: ICD-10-CM

## 2025-02-13 DIAGNOSIS — I10 PRIMARY HYPERTENSION: ICD-10-CM

## 2025-02-13 PROCEDURE — G2211 COMPLEX E/M VISIT ADD ON: HCPCS | Performed by: NURSE PRACTITIONER

## 2025-02-13 PROCEDURE — 3074F SYST BP LT 130 MM HG: CPT | Performed by: NURSE PRACTITIONER

## 2025-02-13 PROCEDURE — 99214 OFFICE O/P EST MOD 30 MIN: CPT | Performed by: NURSE PRACTITIONER

## 2025-02-13 PROCEDURE — 3008F BODY MASS INDEX DOCD: CPT | Performed by: NURSE PRACTITIONER

## 2025-02-13 PROCEDURE — 3078F DIAST BP <80 MM HG: CPT | Performed by: NURSE PRACTITIONER

## 2025-02-13 PROCEDURE — 1123F ACP DISCUSS/DSCN MKR DOCD: CPT | Performed by: NURSE PRACTITIONER

## 2025-02-13 PROCEDURE — 1036F TOBACCO NON-USER: CPT | Performed by: NURSE PRACTITIONER

## 2025-02-13 PROCEDURE — 1159F MED LIST DOCD IN RCRD: CPT | Performed by: NURSE PRACTITIONER

## 2025-02-13 RX ORDER — DEXTROMETHORPHAN HYDROBROMIDE, GUAIFENESIN 5; 100 MG/5ML; MG/5ML
1300 LIQUID ORAL EVERY 12 HOURS
COMMUNITY

## 2025-02-13 NOTE — PATIENT INSTRUCTIONS
Continue current cardiovascular medications  You need to follow up with Dr. Reyez in March  Check blood work in June  Follow up in 6 months  Continue physical activity and weight loss

## 2025-02-13 NOTE — PROGRESS NOTES
Primary Care Physician: Harsha Chu MD  Date of Visit: 02/13/2025  2:40 PM EST  Location of visit: [facility]     Chief Complaint:   Chief Complaint   Patient presents with    Follow-up        HPI / Summary:   Ana Cavanaugh is a 69 y.o. female presents for followup. Seen in collaboration with Dr. Goodwin. She has been walking on the treadmill for 1 hour twice a week and participating in cardio classes twice a week for 45 minutes without chest pain or dyspnea. She has an occasional dyspnea on exertion, but not consistent. Denies chest pain, dyspnea, orthopnea, pnd, lightheadedness, dizziness, syncope, palpitations, lower extremity edema, or bleeding issues.                Past Medical History:  No past medical history on file.       Past Surgical History:  Past Surgical History:   Procedure Laterality Date    CT ABDOMEN PELVIS ANGIOGRAM W AND/OR WO IV CONTRAST  10/28/2021    CT ABDOMEN PELVIS ANGIOGRAM W AND/OR WO IV CONTRAST 10/28/2021 [facility] ANCILLARY LEGACY    CT ABDOMEN PELVIS ANGIOGRAM W AND/OR WO IV CONTRAST  03/18/2022    CT ABDOMEN PELVIS ANGIOGRAM W AND/OR WO IV CONTRAST 3/18/2022 [facility] ANCILLARY LEGACY    CT ABDOMEN PELVIS ANGIOGRAM W AND/OR WO IV CONTRAST  03/17/2023    CT ABDOMEN PELVIS ANGIOGRAM W AND/OR WO IV CONTRAST [facility] CT    CT ANGIO CORONARY ART WITH HEARTFLOW IF SCORE >30%  11/12/2020    CT HEART CORONARY ANGIOGRAM 11/12/2020 [facility] AIB LEGACY    CT GUIDED PERCUTANEOUS BIOPSY LUNG  05/25/2023    CT GUIDED PERCUTANEOUS BIOPSY LUNG 5/25/2023 [facility]    DILATION AND CURETTAGE OF UTERUS      ECTOPIC PREGNANCY SURGERY      salpingectomy    MITRAL VALVE REPAIR      MR HEAD ANGIO WO IV CONTRAST  01/14/2022    MR HEAD ANGIO WO IV CONTRAST 1/14/2022 [facility] ANCILLARY LEGACY    MR NECK ANGIO W AND WO IV CONTRAST  01/14/2022    MR NECK ANGIO W AND WO IV CONTRAST 1/14/2022 [facility] ANCILLARY LEGACY    TRICUSPID VALVE REPLACEMENT      UMBILICAL HERNIA REPAIR  03/17/2015    Umbilical Hernia Repair          Social  "History:   reports that she quit smoking about 34 years ago. Her smoking use included cigarettes. She has never used smokeless tobacco. She reports that she does not currently use alcohol. She reports that she does not use drugs.     Family History:  family history includes Bleeding from varicose vein in her paternal grandmother; Breast cancer (age of onset: 55) in her sister; Cardiac disorder in her mother; Hepatic cirrhosis in her father; Osteoporosis in her mother; Ovarian cancer (age of onset: 90) in her maternal grandmother; Pulmonary fibrosis in her sister; Thyroid disease in her daughter.      Allergies:  Allergies   Allergen Reactions    Hay Fever And Allergy Relief Itching and Runny nose       Outpatient Medications:  Current Outpatient Medications   Medication Instructions    acetaminophen (TYLENOL 8 HOUR) 1,300 mg, Every 12 hours    aspirin 81 mg, oral, Daily    atorvastatin (LIPITOR) 80 mg, oral, Nightly    cholecalciferol (Vitamin D-3) 25 MCG (1000 UT) capsule Daily    dapagliflozin propanediol (FARXIGA) 10 mg, oral, Daily    Entresto  mg tablet 1 tablet, oral, 2 times daily    furosemide (LASIX) 20 mg, oral, Daily    gabapentin (NEURONTIN) 100 mg, oral, 3 times daily    Klor-Con M10 10 mEq ER tablet 10 mEq, oral, Daily    metoprolol succinate XL (Toprol-XL) 100 mg 24 hr tablet TAKE 1 TABLET (100 MG) IN AM AND 1/2 TABLET (50 MG) IN PM    multivitamin-Ca-iron-minerals (Tab-A-Michelle Womens) 27-0.4 mg tablet 1 tablet, Daily    omeprazole (PRILOSEC) 20 mg, oral, Daily    spironolactone (ALDACTONE) 25 mg, oral, Daily       Physical Exam:  Vitals:    02/13/25 1441   BP: 102/66   BP Location: Right arm   Patient Position: Sitting   BP Cuff Size: Adult   Pulse: 63   SpO2: 96%   Weight: 104 kg (229 lb)   Height: 1.676 m (5' 6\")     Wt Readings from Last 5 Encounters:   02/13/25 104 kg (229 lb)   12/26/24 102 kg (225 lb)   11/26/24 105 kg (230 lb 9.6 oz)   08/06/24 104 kg (230 lb)   05/30/24 103 kg (227 lb) "     Body mass index is 36.96 kg/m².     GENERAL: alert, cooperative, pleasant, in no acute distress  SKIN: warm and dry  NECK: Normal JVD, negative HJR  CARDIAC: Regular rate and rhythm with 2/6 holosystolic murmur at apex, no rubs or gallops  CHEST: Normal respiratory efforts, lungs clear to auscultation bilaterally.  ABDOMEN: soft, nontender, nondistended  EXTREMITIES: no edema, +2 palpable RP bilaterally       Last Labs:  Recent Labs     08/06/24  1242 11/15/23  0946 08/17/23  0257   WBC 5.0 4.4 CANCELED   HGB 11.5* 11.9* CANCELED   HCT 35.0* 35.8* CANCELED    171 CANCELED   MCV 96 94 CANCELED     Recent Labs     12/26/24  0905 08/06/24  1242 11/15/23  0946    138 138   K 4.6 5.1 4.2    105 104   BUN 20 16 17   CREATININE 0.92 0.86 0.72     CMP -  Lab Results   Component Value Date    CALCIUM 9.8 12/26/2024    PHOS 2.8 09/27/2022    PROT 7.2 08/06/2024    ALBUMIN 4.1 08/06/2024    AST 30 08/06/2024    ALT 26 08/06/2024    ALKPHOS 76 08/06/2024    BILITOT 0.5 08/06/2024       LIPID PANEL -   Lab Results   Component Value Date    CHOL 114 08/06/2024    HDL 45.1 08/06/2024    LDLF 51 12/09/2022    TRIG 79 08/06/2024   LDL 53 8/6/24    Lab Results   Component Value Date     (H) 05/29/2021    HGBA1C 5.8 (H) 08/06/2024       Last Cardiology Tests:  ECG:  Obtained and reviewed EKG- normal sinus rhythm HR 89 with frequent PVCs    Echo:  Echo Results:  Echocardiogram August 6, 2024  CONCLUSIONS:   1. Basal and mid inferior wall is abnormal.   2. Left ventricular ejection fraction is mildly decreased, by visual estimate at 45%.   3. Abnormal left venticular wall motion.   4. Spectral Doppler shows a pseudonormal pattern of left ventricular diastolic filling.   5. Abnormal septal motion consistent with post-operative status.   6. There is mildly reduced right ventricular systolic function.   7. The left atrium is mild to moderately dilated.   8. The right atrium is mild to moderately dilated.          22  CONCLUSIONS:   1. Left ventricular systolic function is mildly to moderately decreased with a 40% estimated ejection fraction.   2. Abnormal septal motion consistent with post-operative status.   3. Spectral Doppler shows a pseudonormal pattern of left ventricular diastolic filling.   4. The left atrium is severely dilated.   5. The right atrium is moderately dilated.   6. Mild to moderate mitral valve regurgitation.   7. Mild aortic valve regurgitation.   8. There is global hypokinesis of the left ventricle with minor regional variations.         Cath:  21  Coronary Lesion Summary:  Vessel   Stenosis    Vessel Segment  RCA    100% stenosis     distal   CONCLUSIONS:   1. Severe single-vessel coronary artery disease in a right dominant system.   2. Unsuccessful distal RCA PCI due to failure to cross lesion with guidewire.   3. Overall normal left ventricular ejection fraction.       Cardiac MRI 23  [facility]                                                      CMR Report       MRN:                    [id number]                                  Name:             UMAIR HARVEY                                  :                  1955                                  Scan Date:   2023 13:28:38                                   Electronically signed by Mago Samuel  16:07:20     GENERAL INFORMATION   =====================================================================  =====================================     HEIGHT: 66.00 in    (167.64 cm)  WEIGHT: 211.00 lbs    (95.71 kgs)  BSA: 2.05 m\S\2  SCAN LOCATION: [facility]  REFERRING PHYSICIAN: SARAH PALACIOS  ATTENDING PHYSICIAN: SARAH PALACIOS  ACCESSION NUMBER: [id number]  ICD10 CODES: I42.9  PATIENT HISTORY: Device: No, Preg/Nursing: No, HT: 172.72 cm, WT:   93.410137 kg     SUMMARY    =====================================================================  =====================================     1. The left ventricle is enlarged with reduced systolic function.   LVEF 34%. No regional wall motion  abnormality.      2. Near transmural delayed enhancement involving the base and mid   inferoseptal and inferior walls in  keeping with sequela of prior infarction with limited viability in   this region. The rest of the myocardium  demonstrate normal delayed enhancement with no definite evidence of   scarring or infiltrative process.      3. Biatrial enlargement, left more than right. Moderate mitral   regurgitation. Mild aortic regurgitation.      4. Status post median sternotomy and apparent mitral and tricuspid   valve repair.        CT chest without contrast September 21, 2023  HEART AND VESSELS:   The thoracic aorta is of normal course and caliber without vascular   calcifications.      Main pulmonary artery and its branches are normal in caliber.      No coronary artery calcifications are seen. The study is not   optimized for evaluation of coronary arteries.      There is moderate biatrial enlargement. The patient is status post   tricuspid and mitral valve replacement. AICD device within the right   ventricle.          Assessment/Plan   Diagnoses and all orders for this visit:  Dyslipidemia  -     ECG 12 lead (Clinic Performed)  -     Lipid panel; Future  Cardiomyopathy, unspecified type (CMS/HCC)  -     metoprolol succinate XL (Toprol-XL) 100 mg 24 hr tablet; TAKE 1 TABLET (100 MG) IN AM AND 1/2 TABLET (50 MG) IN PM  Chronic systolic congestive heart failure (CMS/HCC)  -     CBC; Future  -     Basic metabolic panel; Future  -     metoprolol succinate XL (Toprol-XL) 100 mg 24 hr tablet; TAKE 1 TABLET (100 MG) IN AM AND 1/2 TABLET (50 MG) IN PM  Hypertension, unspecified type    In summary Ms. Cavanaugh is a pleasant 69 year-old -American female with a past medical history significant for  prior remote unspecified arrhythmia treated with digoxin, mitral and tricuspid valve regurgitation status post repair, postoperative atrial flutter status post internal closure of the left atrial appendage, chronic systolic and diastolic heart failure, non ischemic cardiomyopathy s/p ICD, coronary artery disease s/p NSTEMI 5/21 with unsuccessful PCI to occluded RCA (?scad),obesity, and Spiritism. She is asymptomatic from a cardiac perspective. Her blood pressure is soft. She is asymptomatic. She is euvolemic by clinical exam. 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 I have encouraged her to continue physical activity and weight loss efforts. I have ordered labs as above to be done in June. I have placed clinical pharmacy referral for cost of Entresto and Farxiga. She will continue current cardiovascular medications. She will follow up in 6 months. She will call sooner with questions or concerns.        Orders:  No orders of the defined types were placed in this encounter.     Followup Appts:  Future Appointments   Date Time Provider Department Center   6/2/2025  8:45 AM Jean Pierre Jauregui MD [id number] [facility]   12/2/2025  9:30 AM Harsha Chu MD [id number] [facility]           ____________________________________________________________  Gini Bowers, APRN-CNP  [facility]  [facility]

## 2025-03-14 ENCOUNTER — APPOINTMENT (OUTPATIENT)
Dept: PHARMACY | Facility: HOSPITAL | Age: 70
End: 2025-03-14
Payer: MEDICARE

## 2025-03-14 ENCOUNTER — HOSPITAL ENCOUNTER (OUTPATIENT)
Dept: VASCULAR MEDICINE | Facility: HOSPITAL | Age: 70
Discharge: HOME | End: 2025-03-14
Payer: MEDICARE

## 2025-03-14 ENCOUNTER — OFFICE VISIT (OUTPATIENT)
Dept: VASCULAR SURGERY | Facility: HOSPITAL | Age: 70
End: 2025-03-14
Payer: MEDICARE

## 2025-03-14 VITALS
HEART RATE: 58 BPM | DIASTOLIC BLOOD PRESSURE: 61 MMHG | SYSTOLIC BLOOD PRESSURE: 107 MMHG | HEIGHT: 66 IN | OXYGEN SATURATION: 98 % | BODY MASS INDEX: 36.96 KG/M2 | WEIGHT: 230 LBS

## 2025-03-14 DIAGNOSIS — I72.2: Primary | ICD-10-CM

## 2025-03-14 DIAGNOSIS — I72.8 SPLENIC ARTERY ANEURYSM (CMS-HCC): ICD-10-CM

## 2025-03-14 DIAGNOSIS — I42.9 CARDIOMYOPATHY, UNSPECIFIED TYPE (MULTI): ICD-10-CM

## 2025-03-14 DIAGNOSIS — K55.9 ACUTE VASCULAR DISORDER OF INTESTINE (MULTI): ICD-10-CM

## 2025-03-14 PROCEDURE — 93975 VASCULAR STUDY: CPT | Performed by: INTERNAL MEDICINE

## 2025-03-14 PROCEDURE — 99213 OFFICE O/P EST LOW 20 MIN: CPT | Performed by: NURSE PRACTITIONER

## 2025-03-14 PROCEDURE — 3078F DIAST BP <80 MM HG: CPT | Performed by: NURSE PRACTITIONER

## 2025-03-14 PROCEDURE — 93975 VASCULAR STUDY: CPT

## 2025-03-14 PROCEDURE — 3008F BODY MASS INDEX DOCD: CPT | Performed by: NURSE PRACTITIONER

## 2025-03-14 PROCEDURE — 1123F ACP DISCUSS/DSCN MKR DOCD: CPT | Performed by: NURSE PRACTITIONER

## 2025-03-14 PROCEDURE — 3074F SYST BP LT 130 MM HG: CPT | Performed by: NURSE PRACTITIONER

## 2025-03-14 NOTE — Clinical Note
Chip Rubalcava, Today's appointment was initial visit to establish care with Clinical Pharmacy. Ana is doing well on current regimen. Reports some dizziness with quick position changes, but this resolves quickly. Unfortunately, Ana is ineligible for  Played as her spouse does not file taxes but is meant to do so. I have given her information for a [employer] application to determine if she may qualify. Also evaluated  assistance for Entresto/Farxiga, however 1040 tax forms are required for the application. Will follow up in 1 month to ensure no further issues with medication affordability.

## 2025-03-14 NOTE — PROGRESS NOTES
Pharmacist Clinic: Cardiology Management    Ana Cavanaugh is a 69 y.o. female was referred to Clinical Pharmacy Team for heart failure/cardiomyopathy management.     Referring Provider: Gini Bowers APRN*    THIS IS A NEW PATIENT APPOINTMENT. PATIENT WILL BE ESTABLISHING CARE WITH CLINICAL PHARMACY.    Appointment was completed by the patient who was reached at .    REVIEW OF PAST APPNT (IF APPLICABLE):   N/A    Allergies Reviewed? Yes    Allergies   Allergen Reactions    Hay Fever And Allergy Relief Itching and Runny nose       No past medical history on file.    Current Outpatient Medications on File Prior to Visit   Medication Sig Dispense Refill    acetaminophen (Tylenol 8 HOUR) 650 mg ER tablet Take 2 tablets (1,300 mg) by mouth every 12 hours. Do not crush, chew, or split.      aspirin 81 mg EC tablet Take 1 tablet (81 mg) by mouth once daily. 90 tablet 3    atorvastatin (Lipitor) 80 mg tablet TAKE 1 TABLET BY MOUTH EVERYDAY AT BEDTIME 90 tablet 3    cholecalciferol (Vitamin D-3) 25 MCG (1000 UT) capsule Take 1 capsule (25 mcg) by mouth once daily.      dapagliflozin propanediol (Farxiga) 10 mg Take 1 tablet (10 mg) by mouth once daily. 90 tablet 3    Entresto  mg tablet TAKE 1 TABLET BY MOUTH TWICE A  tablet 3    furosemide (Lasix) 20 mg tablet TAKE 1 TABLET BY MOUTH EVERY DAY 90 tablet 3    gabapentin (Neurontin) 100 mg capsule Take 1 capsule (100 mg) by mouth 3 times a day. 90 capsule 5    metoprolol succinate XL (Toprol-XL) 100 mg 24 hr tablet TAKE 1 TABLET (100 MG) IN AM AND 1/2 TABLET (50 MG) IN  tablet 3    multivitamin-Ca-iron-minerals (Tab-A-Michelle Womens) 27-0.4 mg tablet Take 1 tablet by mouth once daily.      omeprazole (PriLOSEC) 20 mg DR capsule Take 1 capsule (20 mg) by mouth once daily. 90 capsule 3    spironolactone (Aldactone) 25 mg tablet TAKE 1 TABLET BY MOUTH EVERY DAY 90 tablet 3    Klor-Con M10 10 mEq ER tablet TAKE 1 TABLET BY MOUTH EVERY DAY  "(Patient not taking: Reported on 3/14/2025) 90 tablet 3     No current facility-administered medications on file prior to visit.         RELEVANT LAB RESULTS:  Lab Results   Component Value Date    BILITOT 0.5 2024    CALCIUM 9.8 2024    CO2 29 2024     2024    CREATININE 0.92 2024    GLUCOSE 98 2024    ALKPHOS 76 2024    K 4.6 2024    PROT 7.2 2024     2024    AST 30 2024    ALT 26 2024    BUN 20 2024    ANIONGAP 11 2024    MG 2.0 2023    PHOS 2.8 2022     (H) 2021    ALBUMIN 4.1 2024    LIPASE 33 2021    GFRF CANCELED 2023    GFRMALE CANCELED 2023     Lab Results   Component Value Date    TRIG 79 2024    CHOL 114 2024    LDLCALC 53 2024    HDL 45.1 2024     No results found for: \"BMCBC\", \"CBCDIF\"     PHARMACEUTICAL ASSESSMENT:    MEDICATION RECONCILIATION    Was a medication reconciliation completed at this visit? Yes  Home Pharmacy Reviewed? Yes, describe: [facility]; Elian [facility] ([facility])    Added:  - None    Changed:  - Vitamin D3 25 mc capsule by mouth once daily  - Potassium chloride 10 mEq: patient reports no longer taking    Removed:  - None    Drug Interactions? No clinically significant drug interactions requiring change in therapy found at the time of this visit.     Medication Documentation Review Audit       Reviewed by Patito MosesD (Pharmacist) on 25 at 1448      Medication Order Taking? Sig Documenting Provider Last Dose Status   acetaminophen (Tylenol 8 HOUR) 650 mg ER tablet [id number] Yes Take 2 tablets (1,300 mg) by mouth every 12 hours. Do not crush, chew, or split. Historical Provider, MD  Active   aspirin 81 mg EC tablet [id number] Yes Take 1 tablet (81 mg) by mouth once daily. Gini Bowers, APRN-CNP  Active   atorvastatin (Lipitor) 80 mg tablet [id number] Yes TAKE 1 TABLET BY MOUTH EVERYDAY AT BEDTIME " ZIYAD Rapp  Active   cholecalciferol (Vitamin D-3) 25 MCG (1000 UT) capsule [id number] Yes Take 1 capsule (25 mcg) by mouth once daily. Historical Provider, MD  Active   dapagliflozin propanediol (Farxiga) 10 mg [id number] Yes Take 1 tablet (10 mg) by mouth once daily. ZIYAD Rapp  Active   Entresto  mg tablet [id number] Yes TAKE 1 TABLET BY MOUTH TWICE A DAY ZIYAD Rapp  Active   furosemide (Lasix) 20 mg tablet [id number] Yes TAKE 1 TABLET BY MOUTH EVERY DAY ZIYAD Rapp  Active   gabapentin (Neurontin) 100 mg capsule [id number] Yes Take 1 capsule (100 mg) by mouth 3 times a day. Harsha Chu MD  Active   Klor-Con M10 10 mEq ER tablet [id number]  TAKE 1 TABLET BY MOUTH EVERY DAY   Patient not taking: Reported on 3/14/2025    Jose Goodwin MD  Active   metoprolol succinate XL (Toprol-XL) 100 mg 24 hr tablet [id number] Yes TAKE 1 TABLET (100 MG) IN AM AND 1/2 TABLET (50 MG) IN PM ZIYAD Rapp  Active   multivitamin-Ca-iron-minerals (Tab-A-Michelle Womens) 27-0.4 mg tablet [id number] Yes Take 1 tablet by mouth once daily. Historical Provider, MD  Active   omeprazole (PriLOSEC) 20 mg DR capsule [id number] Yes Take 1 capsule (20 mg) by mouth once daily. Jose Goodwin MD  Active   spironolactone (Aldactone) 25 mg tablet [id number] Yes TAKE 1 TABLET BY MOUTH EVERY DAY Jose Goodwin MD  Active                    DISEASE MANAGEMENT ASSESSMENT:     CHF ASSESSMENT     Symptom/Staging:  -Most recent ejection fraction: 45%  -NYHA Stage: Unknown    Results for orders placed during the hospital encounter of 08/06/24    Transthoracic echo (TTE) complete    Narrative  [facility], [address] [place] [address]  Tel [phone] and Fax [phone]    TRANSTHORACIC ECHOCARDIOGRAM REPORT      Patient Name:      UMAIR Pacheco Physician:    [id number] Jose Goodwin MD  Study Date:        8/6/2024             Ordering Provider:     [id number] ROLLY JUAREZ  MRN/PID:           [id number]             Fellow:  Accession#:        [id number]         Nurse:  Date of Birth/Age: 1955 / 68      Sonographer:          Belkis avalos                                      YARI  Gender:            F                    Additional Staff:  Height:            167.00 cm            Admit Date:           8/6/2024  Weight:            103.00 kg            Admission Status:     Outpatient  BSA / BMI:         2.11 m2 / 36.93      Encounter#:           [id number]  kg/m2  Blood Pressure:    100/59 mmHg          Department Location:  [facility] Non  Invasive    Study Type:    TRANSTHORACIC ECHO (TTE) COMPLETE  Diagnosis/ICD: Cardiomyopathy, unspecified-I42.9  Indication:    cardiomyopathy    Patient History:  Valve Disorders:   Mitral Regurgitation and Tricuspid Valve Repair.  Pertinent History: CHF, HTN and Cardiomyopathy. H/O mitral valve repair.    Study Detail: The following Echo studies were performed: 2D, M-Mode, Doppler and  color flow. Technically challenging study due to body habitus.      PHYSICIAN INTERPRETATION:  Left Ventricle: Left ventricular ejection fraction is mildly decreased, by visual estimate at 45%. Left venticular wall motion is abnormal. The left ventricular cavity size is normal. Abnormal (paradoxical) septal motion consistent with post-operative status. Spectral Doppler shows a pseudonormal pattern of left ventricular diastolic filling.  LV Wall Scoring:  The basal and mid inferior wall is akinetic.    Left Atrium: The left atrium is mild to moderately dilated.  Right Ventricle: The right ventricle is normal in size. There is mildly reduced right ventricular systolic function. A device is visualized in the right ventricle.  Right Atrium: The right atrium is mild to moderately dilated. There is a device visualized in the right atrium.  Aortic Valve: The aortic valve is trileaflet. The aortic valve dimensionless index is 0.33. There  is trace to mild aortic valve regurgitation. The peak instantaneous gradient of the aortic valve is 16.0 mmHg. The mean gradient of the aortic valve is 9.0 mmHg.  Mitral Valve: The mitral valve is mild to moderately thickened. Status post mitral annular ring repair. There is mild mitral valve regurgitation.  Tricuspid Valve: The tricuspid valve is structurally normal. Status post triscuspid annular ring repair. There is trace tricuspid regurgitation.  Pulmonic Valve: The pulmonic valve is not well visualized. The pulmonic valve regurgitation was not well visualized.  Pericardium: There is no pericardial effusion noted.  Aorta: The aortic root is normal.  In comparison to the previous echocardiogram(s): Compared with study dated 11/30/2022, no significant change.      CONCLUSIONS:  1. Basal and mid inferior wall is abnormal.  2. Left ventricular ejection fraction is mildly decreased, by visual estimate at 45%.  3. Abnormal left venticular wall motion.  4. Spectral Doppler shows a pseudonormal pattern of left ventricular diastolic filling.  5. Abnormal septal motion consistent with post-operative status.  6. There is mildly reduced right ventricular systolic function.  7. The left atrium is mild to moderately dilated.  8. The right atrium is mild to moderately dilated.    QUANTITATIVE DATA SUMMARY:  2D MEASUREMENTS:  Normal Ranges:  LAs:           4.40 cm  (2.7-4.0cm)  IVSd:          0.90 cm  (0.6-1.1cm)  LVPWd:         0.90 cm  (0.6-1.1cm)  LVIDd:         5.60 cm  (3.9-5.9cm)  LVIDs:         4.00 cm  LV Mass Index: 91 g/m2  LVEDV Index:   70 ml/m2  LV % FS        28.6 %    LA VOLUME:  Normal Ranges:  LA Vol A4C:        73.3 ml    (22+/-6mL/m2)  LA Vol A2C:        80.2 ml  LA Vol BP:         79.4 ml  LA Vol Index A4C:  34.8ml/m2  LA Vol Index A2C:  38.1 ml/m2  LA Vol Index BP:   37.7 ml/m2  LA Area A4C:       21.3 cm2  LA Area A2C:       21.5 cm2  LA Major Axis A4C: 5.3 cm  LA Major Axis A2C: 4.9 cm  LA Volume Index:    37.7 ml/m2    RA VOLUME BY A/L METHOD:  Normal Ranges:  RA Area A4C: 17.7 cm2    AORTA MEASUREMENTS:  Normal Ranges:  Ao Sinus, d: 2.77 cm (2.1-3.5cm)  Ao STJ, d:   2.50 cm (1.7-3.4cm)  Asc Ao, d:   3.11 cm (2.1-3.4cm)    LV SYSTOLIC FUNCTION BY 2D PLANIMETRY (MOD):  Normal Ranges:  EF-A4C View:    56 % (>=55%)  EF-A2C View:    54 %  EF-Biplane:     56 %  EF-Visual:      45 %  LV EF Reported: 45 %    LV DIASTOLIC FUNCTION:  Normal Ranges:  MV Peak E:        1.40 m/s    (0.7-1.2 m/s)  MV Peak A:        0.91 m/s    (0.42-0.7 m/s)  E/A Ratio:        1.54        (1.0-2.2)  MV e'             0.065 m/s   (>8.0)  MV lateral e'     0.08 m/s  MV medial e'      0.05 m/s  MV A Dur:         153.00 msec  E/e' Ratio:       21.64       (<8.0)  PulmV Sys Selwyn:    29.40 cm/s  PulmV Ojeda Selwyn:   48.90 cm/s  PulmV S/D Selwyn:    0.60  PulmV A Revs Selwyn: 16.00 cm/s  PulmV A Revs Dur: 114.00 msec    MITRAL VALVE:  Normal Ranges:  MV Vmax:    1.67 m/s  (<=1.3m/s)  MV peak P.2 mmHg (<5mmHg)  MV mean P.0 mmHg  (<2mmHg)  MV DT:      436 msec  (150-240msec)    MITRAL INSUFFICIENCY:  Normal Ranges:  PISA Radius: 0.5 cm    AORTIC VALVE:  Normal Ranges:  AoV Vmax:                2.00 m/s  (<=1.7m/s)  AoV Peak P.0 mmHg (<20mmHg)  AoV Mean P.0 mmHg  (1.7-11.5mmHg)  LVOT Max Selwyn:            0.64 m/s  (<=1.1m/s)  AoV VTI:                 47.40 cm  (18-25cm)  LVOT VTI:                15.60 cm  LVOT Diameter:           2.00 cm   (1.8-2.4cm)  AoV Area, VTI:           1.03 cm2  (2.5-5.5cm2)  AoV Area,Vmax:           1.00 cm2  (2.5-4.5cm2)  AoV Dimensionless Index: 0.33    AORTIC INSUFFICIENCY:  AI Vmax:       3.76 m/s  AI Half-time:  688 msec  AI Decel Rate: 160.00 cm/s2      RIGHT VENTRICLE:  RV Basal 3.72 cm  RV Mid   2.21 cm  RV Major 8.8 cm  TAPSE:   15.2 mm    TRICUSPID VALVE/RVSP:  Normal Ranges:  Peak TR Velocity: 2.37 m/s  Est. RA Pressure: 3 mmHg  RV Syst Pressure: 25.5 mmHg (< 30mmHg)  IVC Diam:          1.03 cm    PULMONIC VALVE:  Normal Ranges:  PV Accel Time: 201 msec (>120ms)  PV Max Selwyn:    1.2 m/s  (0.6-0.9m/s)  PV Max P.0 mmHg    Pulmonary Veins:  PulmV A Revs Dur: 114.00 msec  PulmV A Revs Selwyn: 16.00 cm/s  PulmV Ojeda Selwyn:   48.90 cm/s  PulmV S/D Selwyn:    0.60  PulmV Sys Selwyn:    29.40 cm/s      [id number] Jose Goodwin MD  Electronically signed on 2024 at 1:27:10 PM      Wall Scoring        ** Final **      Guideline-Directed Medical Therapy:  -ARNI: Yes, describe: Entresto  mg BID  -Beta Blocker: Yes, describe: Metoprolol succinate 100 mg 1 tablet qAM and 0.5 tablets qPM  -MRA: Yes, describe: Spironolactone 25 mg every day   -SGLT2i: Yes, describe: Farxiga 10 mg every day     Secondary Prevention:  -The ASCVD Risk score (Cornell BRASWELL, et al., 2019) failed to calculate for the following reasons:    Risk score cannot be calculated because patient has a medical history suggesting prior/existing ASCVD   -Aspirin 81mg? yes  -Statin?: Yes, describe: Atorvastatin 80 mg every day   -HTN?: Yes, describe: Controlled -  as of 2025    CURRENT PHARMACOTHERAPY:   Farxiga 10 mg every day   Entresto  mg BID  Furosemide 20 mg every day   Metoprolol succinate 100 mg 1 tablet qAM and 0.5 tablets qPM  Spironolactone 25 mg every day     Affordability: Farxiga and Entresto create cost burden for patient  Adherence/Compliance: patient confirms taking medications as prescribed; denies any missed doses  Adverse Effects: some dizziness when quickly standing up or bending over; resolves quickly    Monitoring Weights at Home: Yes, daily  Home Weight Recordings: 230 lbs (stable - no acute fluctuations)    Past In Office Weight Readings:   Wt Readings from Last 6 Encounters:   25 104 kg (230 lb)   25 104 kg (229 lb)   24 102 kg (225 lb)   24 105 kg (230 lb 9.6 oz)   24 104 kg (230 lb)   24 103 kg (227 lb)       Monitoring Blood Pressure at Home: No, confirms she does have a  meter at home  Home BP Recordings: None    Past In Office BP Readings:   BP Readings from Last 6 Encounters:   03/14/25 107/61   02/13/25 102/66   11/26/24 97/66   08/06/24 97/60   05/30/24 100/59   03/15/24 110/56       HEALTH MANAGEMENT    Maintaining fluid restriction (<2 L/day): N/A  Edema/swelling: No  Shortness of breath: No consistent SOB, will note on occasion with over exertion (multiple flights of stairs or walking very quickly). None with typical daily activities.   Trouble sleeping/lying down: No  Dry/hacking cough: Yes, intermittently. Does report 50% of the time her cough will be productive with phlegm. Also has sinus issues/allergies.   Recent Hospitalizations: No    EDUCATION/COUNSELING:   - Counseled patient on MOA, expectations, duration of therapy, contraindications, administration, and monitoring parameters  - Counseled patient on lifestyle modifications that can decrease your risk of having complications (smoking cessation, losing weight, daily weights, vaccines)  - Counseled patient on fluid intake and weight management. Recommended to not consume more than 2 liters of fliuids per day. If they have gained more than 2-3 pounds within a 24 hours period (or 5 pounds in a week), contact their cardiologist  - Answered all patient questions and concerns      DISCUSSION/NOTES:   Today's appointment was initial visit to establish care with Clinical Pharmacy. Ana is doing well on current regimen. Reports some dizziness with quick position changes, but this resolves quickly.  Unfortunately, at this time, Ana Cavanaugh is ineligible to receive financial assistance through  Patient Assistance Program because patient's spouse has not filed taxes but is meant to do so.  Patient was notified of ineligibility and given the following options: patient given information regarding [employer]. Also evaluated  assistance for Entresto/Farxiga, however 1040 tax forms are required for  the application.  Will follow up in 1 month to ensure no further issues with medication affordability.    ASSESSMENT:    Assessment/Plan   Problem List Items Addressed This Visit       Cardiomyopathy     Ana is prescribed 4/4 GDMT. No dosage adjustments recommended at this time, will continue current regimen unchanged.         Relevant Orders    Referral to Clinical Pharmacy       RECOMMENDATIONS/PLAN:    Continue:   Farxiga 10 mg every day   Entresto  mg BID  Furosemide 20 mg every day   Metoprolol succinate 100 mg 1 tablet qAM and 0.5 tablets qPM  Spironolactone 25 mg every day   Follow up: 1 month    Last Appnt with Referring Provider: 2/13/2025  Next Appnt with Referring Provider: 8/19/2025  Clinical Pharmacist follow up: 4/17/2025  VAF/Application Expiration: No  Type of Encounter: Alma Islas PharmD    Verbal consent to manage patient's drug therapy was obtained from the patient . They were informed they may decline to participate or withdraw from participation in pharmacy services at any time.    Continue all meds under the continuation of care with the referring provider and clinical pharmacy team.

## 2025-03-14 NOTE — ASSESSMENT & PLAN NOTE
Ana is prescribed 4/4 GDMT. No dosage adjustments recommended at this time, will continue current regimen unchanged.

## 2025-03-17 ENCOUNTER — HOSPITAL ENCOUNTER (OUTPATIENT)
Dept: CARDIOLOGY | Facility: CLINIC | Age: 70
Discharge: HOME | End: 2025-03-17
Payer: MEDICARE

## 2025-03-17 DIAGNOSIS — Z95.810 PRESENCE OF AUTOMATIC (IMPLANTABLE) CARDIAC DEFIBRILLATOR: ICD-10-CM

## 2025-03-17 DIAGNOSIS — I47.29 OTHER VENTRICULAR TACHYCARDIA: ICD-10-CM

## 2025-03-17 PROCEDURE — 93296 REM INTERROG EVL PM/IDS: CPT

## 2025-03-25 NOTE — PROGRESS NOTES
Vascular Surgery Clinic Note    Referring provider: No referring provider defined for this encounter.  Date of visit: 03/14/2025 11:15 AM EDT  Location of visit: [facility]    CC: FUV     History Of Present Illness:   Ana Cavanaugh is a 69 y.o. female here for routine follow-up of her right renal and splenic artery aneurysms.  She denies any abdominal or back pain that may be attributed to her aneurysms.  Her blood pressure is well-controlled.  She denies postprandial pain or weight loss.    She denies amaurosis fugax or TIA symptoms.  She denies claudication symptoms.  Her aunt had a brain aneurysm.    She is on aspirin and a statin daily.    Medical History:  Patient Active Problem List   Diagnosis    Abdominal pain    Age-related nuclear cataract of both eyes    Anemia    ASB (asymptomatic bacteriuria)    Aspiration into respiratory tract    Arthritis of knee, degenerative    Arthritis of knee, right    Ataxia    Atrial flutter (Multi)    Blood in urine    Bronchitis    Chest pain    Cardiac chest pain    Cardiomyopathy    Cervical disc disease    Disorder of intervertebral disc of cervical spine    Congestive heart failure    Disorder of connective tissue (Multi)    Condition not found    Degenerative arthritis of knee, bilateral    Primary osteoarthritis of right knee    History of mitral valve repair    Primary osteoarthritis of left knee    Dyslipidemia    Vitreous floaters    Increased frequency of urination    Gastroesophageal reflux disease    Heart murmur    Hypertension    Hypoxia    Reactive airway disease (HHS-HCC)    Left knee pain    Swelling of lower extremity    Leukocytosis    Lightheadedness    Migraine headache    Multinodular non-toxic goiter    Nausea and vomiting    Neck pain    Myocardial infarction in recovery phase (Multi)    Obesity    Ocular migraine    Palpitations    Pericardial effusion (HHS-HCC)    Posterior vitreous detachment of both eyes    Right arm weakness    S/P TVR  (tricuspid valve replacement)    Sciatica    Dyspnea on exertion    Shortness of breath    Renal artery aneurysm (CMS-HCC)    Aneurysm of splenic artery (CMS-HCC)    Dissection of coronary artery    History of open heart surgery    SVT (supraventricular tachycardia) (CMS-HCC)    Other ventricular tachycardia    Cyst of thyroid    Thyroid nodule    Mitral regurgitation    Tricuspid valve insufficiency    UTI (urinary tract infection)    Frequent PVCs    Weakness of left lower extremity    Dissecting aneurysm of renal artery (Multi)    Implantable cardioverter-defibrillator (ICD) in situ    Solitary pulmonary nodule    Arthralgia of right knee    Lipoma of skin and subcutaneous tissue    Cardiac arrhythmia    Acute diastolic congestive heart failure    Tear of meniscus of knee    Knee stiffness    Effusion, right knee    Nuclear senile cataract    Postmenopausal bleeding    Vascular disorder of intestine (Multi)    Bilateral hand pain    Hearing difficulty of both ears        SH:    Social Drivers of Health     Tobacco Use: Medium Risk (2/13/2025)    Patient History     Smoking Tobacco Use: Former     Smokeless Tobacco Use: Never     Passive Exposure: Not on file   Alcohol Use: Not on file   Financial Resource Strain: Not on file   Food Insecurity: Not on file   Transportation Needs: Not on file   Physical Activity: Not on file   Stress: Not on file   Social Connections: Not on file   Intimate Partner Violence: Not on file   Depression: Not at risk (11/26/2024)    PHQ-2     PHQ-2 Score: 0   Housing Stability: Not on file   Utilities: Not on file   Digital Equity: Not on file   Health Literacy: Not on file        FH:  Family History   Problem Relation Name Age of Onset    Other (Cardiac disorder) Mother      Osteoporosis Mother      Other (Hepatic cirrhosis) Father      Breast cancer Sister  55    Pulmonary fibrosis Sister      Thyroid disease Daughter      Ovarian cancer Maternal Grandmother  90    Other (Bleeding  "from varicose vein) Paternal Grandmother          Allergies:   Allergies   Allergen Reactions    Hay Fever And Allergy Relief Itching and Runny nose       ROS:  All systems were reviewed and noted to be negative, other than described above.     Objective:  Last Recorded Vitals  Blood pressure 107/61, pulse 58, height 1.676 m (5' 6\"), weight 104 kg (230 lb), last menstrual period 12/10/2021, SpO2 98%.    Meds:   Current Outpatient Medications   Medication Instructions    acetaminophen (TYLENOL 8 HOUR) 1,300 mg, Every 12 hours    aspirin 81 mg, oral, Daily    atorvastatin (LIPITOR) 80 mg, oral, Nightly    cholecalciferol (VITAMIN D-3) 25 mcg, Daily    dapagliflozin propanediol (FARXIGA) 10 mg, oral, Daily    Entresto  mg tablet 1 tablet, oral, 2 times daily    furosemide (LASIX) 20 mg, oral, Daily    gabapentin (NEURONTIN) 100 mg, oral, 3 times daily    Klor-Con M10 10 mEq ER tablet 10 mEq, oral, Daily    metoprolol succinate XL (Toprol-XL) 100 mg 24 hr tablet TAKE 1 TABLET (100 MG) IN AM AND 1/2 TABLET (50 MG) IN PM    multivitamin-Ca-iron-minerals (Tab-A-Michelle Womens) 27-0.4 mg tablet 1 tablet, Daily    omeprazole (PRILOSEC) 20 mg, oral, Daily    spironolactone (ALDACTONE) 25 mg, oral, Daily       Exam:  Constitutional: Well appearing, NAD   PSYCH: Appropriate mood and affect  Eyes: Sclera clear  Neck: Supple   CV: No tachycardia  RESP: Unlabored breathing  GI: Soft, nontender, non-distended  SKIN: No lesions  NEURO: No focal deficits noted. Motor/sensory intact.   EXTREMITIES: Warm & well perfused. No leg edema. No evidence of arterial ischemia. No evidence of venous insufficiency.   PULSES: Normal pulse exam throughout.    Imaging Reviewed:  Vascular US mesenteric artery duplex complete 03/14/2025    [place] [id number]  Tel [phone] and Fax [phone]      Vascular Lab Report  VASC US MESENTERIC ARTERY DUPLEX COMPLETE      Patient Name:      UMAIR CANSECO" WES       Reading Physician:  [id number] Ivis Ruiz MD  Study Date:        3/14/2025           Ordering Physician: [id number] KRISTY S BEATA  MRN/PID:           [id number]            Technologist:       Ruben Gamboa RVKATE  Accession#:        [id number]        Technologist 2:  Date of Birth/Age: 1955 / 69     Encounter#:         [id number]  years  Gender:            F  Admission Status:  Outpatient          Location Performed: [facility]      Diagnosis/ICD: Acute mesenteric ischemia-K55.0; Aneurysm of other specified  arteries-I72.8  CPT Codes:     88047 Mesenteric Duplex scan      CONCLUSIONS:  Mesenteric: 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 Velocities are decreased with inspiration and/or change to a sitting position which may be suggesive of median arcuate ligament compression as opposed to intrinsic stenosis. SMA demonstrates no evidence of hemodynamically significant stenosis, hepatic artery appears widely patent, splenic artery appears widely patent and the BRYAN appears widely patent. The patient was NPO for this study. Celiac artery mid segment velocities normalize with deep inspiration.  Known distal splenic artery aneurysm measuring approximately 1.5 x 1.5 cm.    Comparison:  Compared with study from 3/15/2024, the celiac artery mid segment is noted with a stenosis of greater than 70% which normalizes with deep inspiration.    Imaging & Doppler Findings:    Aorta PSV         96 cm/s  Celiac Origin PSV 97 cm/s  Celiac Prox PSV   217 cm/s  Celiac Mid PSV    271 cm/s  SMA Origin PSV    79 cm/s  SMA Prox PSV      99 cm/s  SMA Mid PSV       139 cm/s  SMA Dist PSV      115 cm/s  BRYAN PSV           122 cm/s  BRYAN Prox PSV      152 cm/s  Hepatic PSV       142 cm/s  Splenic PSV       89 cm/s        [id number] Ivis Ruiz MD  Electronically signed by [id number] Ruiz MD on 3/14/2025 at 11:04:36 AM        ** Final **      Assessment & Plan:  1. Renal artery aneurysm  of native kidney (CMS-HCC)  CT angio abdomen pelvis w and or wo IV IV contrast      2. Splenic artery aneurysm (CMS-HCC)  CT angio abdomen pelvis w and or wo IV IV contrast        #Splenic artery aneurysm  #Right renal artery aneurysm  Duplex measures splenic artery aneurysm to be 1.5 cm.   Continue aspirin & statin   RTC in 1 year with CT     Orders:  Orders Placed This Encounter   Procedures    CT angio abdomen pelvis w and or wo IV IV contrast     Johanna Segovia, APRN-CNP

## 2025-04-03 ENCOUNTER — PATIENT MESSAGE (OUTPATIENT)
Dept: CARDIOLOGY | Facility: HOSPITAL | Age: 70
End: 2025-04-03
Payer: MEDICARE

## 2025-04-03 DIAGNOSIS — I50.22 CHRONIC SYSTOLIC CONGESTIVE HEART FAILURE: ICD-10-CM

## 2025-04-03 DIAGNOSIS — I50.9 HEART FAILURE, UNSPECIFIED: ICD-10-CM

## 2025-04-03 RX ORDER — DAPAGLIFLOZIN 10 MG/1
10 TABLET, FILM COATED ORAL DAILY
Qty: 90 TABLET | Refills: 3 | Status: SHIPPED | OUTPATIENT
Start: 2025-04-03 | End: 2026-04-03

## 2025-04-03 RX ORDER — SACUBITRIL AND VALSARTAN 97; 103 MG/1; MG/1
1 TABLET, FILM COATED ORAL 2 TIMES DAILY
Qty: 180 TABLET | Refills: 3 | Status: SHIPPED | OUTPATIENT
Start: 2025-04-03 | End: 2026-04-03

## 2025-04-17 ENCOUNTER — APPOINTMENT (OUTPATIENT)
Dept: PHARMACY | Facility: HOSPITAL | Age: 70
End: 2025-04-17
Payer: MEDICARE

## 2025-04-24 NOTE — PROGRESS NOTES
Pharmacist Clinic: Cardiology Management    Ana Cavanaugh is a 69 y.o. female was referred to Clinical Pharmacy Team for heart failure/cardiomyopathy management.     Referring Provider: Gini Bowers APRN*    THIS IS A FOLLOW UP PATIENT APPOINTMENT. AT LAST VISIT ON 3/14/2025 WITH PHARMACIST (Huma Islas).    Appointment was completed by the patient who was reached at .    REVIEW OF PAST APPNT (IF APPLICABLE):   Today's appointment was initial visit to establish care with Clinical Pharmacy. Ana is doing well on current regimen. Reports some dizziness with quick position changes, but this resolves quickly.  Unfortunately, at this time, Ana Cavanaugh is ineligible to receive financial assistance through  Patient Assistance Program because patient's spouse has not filed taxes but is meant to do so.  Patient was notified of ineligibility and given the following options: patient given information regarding [employer]. Also evaluated  assistance for Entresto/Farxiga, however 1040 tax forms are required for the application.  Will follow up in 1 month to ensure no further issues with medication affordability.    Allergies   Allergen Reactions    Hay Fever And Allergy Relief Itching and Runny nose       No past medical history on file.    Current Outpatient Medications on File Prior to Visit   Medication Sig Dispense Refill    acetaminophen (Tylenol 8 HOUR) 650 mg ER tablet Take 2 tablets (1,300 mg) by mouth every 12 hours. Do not crush, chew, or split.      aspirin 81 mg EC tablet Take 1 tablet (81 mg) by mouth once daily. 90 tablet 3    atorvastatin (Lipitor) 80 mg tablet TAKE 1 TABLET BY MOUTH EVERYDAY AT BEDTIME 90 tablet 3    cholecalciferol (Vitamin D-3) 25 MCG (1000 UT) capsule Take 1 capsule (25 mcg) by mouth once daily.      dapagliflozin propanediol (Farxiga) 10 mg tablet Take 1 tablet (10 mg) by mouth once daily. 90 tablet 3    furosemide (Lasix) 20 mg tablet TAKE  "1 TABLET BY MOUTH EVERY DAY 90 tablet 3    gabapentin (Neurontin) 100 mg capsule Take 1 capsule (100 mg) by mouth 3 times a day. 90 capsule 5    Klor-Con M10 10 mEq ER tablet TAKE 1 TABLET BY MOUTH EVERY DAY (Patient not taking: Reported on 3/14/2025) 90 tablet 3    metoprolol succinate XL (Toprol-XL) 100 mg 24 hr tablet TAKE 1 TABLET (100 MG) IN AM AND 1/2 TABLET (50 MG) IN  tablet 3    multivitamin-Ca-iron-minerals (Tab-A-Michelle Womens) 27-0.4 mg tablet Take 1 tablet by mouth once daily.      omeprazole (PriLOSEC) 20 mg DR capsule Take 1 capsule (20 mg) by mouth once daily. 90 capsule 3    sacubitriL-valsartan (Entresto)  mg tablet Take 1 tablet by mouth 2 times a day. 180 tablet 3    spironolactone (Aldactone) 25 mg tablet TAKE 1 TABLET BY MOUTH EVERY DAY 90 tablet 3     No current facility-administered medications on file prior to visit.         RELEVANT LAB RESULTS:  Lab Results   Component Value Date    BILITOT 0.5 08/06/2024    CALCIUM 9.8 12/26/2024    CO2 29 12/26/2024     12/26/2024    CREATININE 0.92 12/26/2024    GLUCOSE 98 12/26/2024    ALKPHOS 76 08/06/2024    K 4.6 12/26/2024    PROT 7.2 08/06/2024     12/26/2024    AST 30 08/06/2024    ALT 26 08/06/2024    BUN 20 12/26/2024    ANIONGAP 11 12/26/2024    MG 2.0 08/02/2023    PHOS 2.8 09/27/2022     (H) 08/06/2021    ALBUMIN 4.1 08/06/2024    LIPASE 33 05/29/2021    GFRF CANCELED 08/17/2023    GFRMALE CANCELED 08/17/2023     Lab Results   Component Value Date    TRIG 79 08/06/2024    CHOL 114 08/06/2024    LDLCALC 53 08/06/2024    HDL 45.1 08/06/2024     No results found for: \"BMCBC\", \"CBCDIF\"     PHARMACEUTICAL ASSESSMENT:    MEDICATION RECONCILIATION    Was a medication reconciliation completed at this visit? No  Home Pharmacy Reviewed? Yes, describe: [facility]; Elian FELTON ([facility])    Added:  - None    Changed:  - None    Removed:  - None    Drug Interactions? No clinically significant drug interactions requiring " change in therapy found at the time of this visit.     Medication Documentation Review Audit       Reviewed by Huma Islas PharmD (Pharmacist) on 03/14/25 at 1448      Medication Order Taking? Sig Documenting Provider Last Dose Status   acetaminophen (Tylenol 8 HOUR) 650 mg ER tablet [id number] Yes Take 2 tablets (1,300 mg) by mouth every 12 hours. Do not crush, chew, or split. Historical Provider, MD  Active   aspirin 81 mg EC tablet [id number] Yes Take 1 tablet (81 mg) by mouth once daily. ZIYAD Rapp  Active   atorvastatin (Lipitor) 80 mg tablet [id number] Yes TAKE 1 TABLET BY MOUTH EVERYDAY AT BEDTIME ZIYAD Rapp  Active   cholecalciferol (Vitamin D-3) 25 MCG (1000 UT) capsule [id number] Yes Take 1 capsule (25 mcg) by mouth once daily. Historical Provider, MD  Active   dapagliflozin propanediol (Farxiga) 10 mg [id number] Yes Take 1 tablet (10 mg) by mouth once daily. ZIYAD Rapp  Active   Entresto  mg tablet [id number] Yes TAKE 1 TABLET BY MOUTH TWICE A DAY ZIYAD Rapp  Active   furosemide (Lasix) 20 mg tablet [id number] Yes TAKE 1 TABLET BY MOUTH EVERY DAY ZIYAD Rapp  Active   gabapentin (Neurontin) 100 mg capsule [id number] Yes Take 1 capsule (100 mg) by mouth 3 times a day. Harsha Chu MD  Active   Klor-Con M10 10 mEq ER tablet [id number]  TAKE 1 TABLET BY MOUTH EVERY DAY   Patient not taking: Reported on 3/14/2025    Jose Goodwin MD  Active   metoprolol succinate XL (Toprol-XL) 100 mg 24 hr tablet [id number] Yes TAKE 1 TABLET (100 MG) IN AM AND 1/2 TABLET (50 MG) IN PM ZIYAD Rapp  Active   multivitamin-Ca-iron-minerals (Tab-A-Michelle Womens) 27-0.4 mg tablet [id number] Yes Take 1 tablet by mouth once daily. Historical Provider, MD  Active   omeprazole (PriLOSEC) 20 mg DR capsule [id number] Yes Take 1 capsule (20 mg) by mouth once daily. Jose Goodwin MD  Active   spironolactone (Aldactone) 25 mg tablet [id number] Yes  TAKE 1 TABLET BY MOUTH EVERY DAY Jose Goodwin MD  Active                    DISEASE MANAGEMENT ASSESSMENT:     CHF ASSESSMENT     Symptom/Staging:  -Most recent ejection fraction: 45%  -NYHA Stage: Unknown    Results for orders placed during the hospital encounter of 08/06/24    Transthoracic echo (TTE) complete    Narrative  [facility], [address] [place] [address]  Tel [phone] and Fax [phone]    TRANSTHORACIC ECHOCARDIOGRAM REPORT      Patient Name:      UMAIR Pacheco Physician:    [id number] Jose Goodwin MD  Study Date:        8/6/2024             Ordering Provider:    [id number] ROLLY JUAREZ  MRN/PID:           [id number]             Fellow:  Accession#:        [id number]         Nurse:  Date of Birth/Age: 1955 / 68      Sonographer:          Belkis avalos RDCS  Gender:            F                    Additional Staff:  Height:            167.00 cm            Admit Date:           8/6/2024  Weight:            103.00 kg            Admission Status:     Outpatient  BSA / BMI:         2.11 m2 / 36.93      Encounter#:           [id number]  kg/m2  Blood Pressure:    100/59 mmHg          Department Location:  [facility] Non  Invasive    Study Type:    TRANSTHORACIC ECHO (TTE) COMPLETE  Diagnosis/ICD: Cardiomyopathy, unspecified-I42.9  Indication:    cardiomyopathy    Patient History:  Valve Disorders:   Mitral Regurgitation and Tricuspid Valve Repair.  Pertinent History: CHF, HTN and Cardiomyopathy. H/O mitral valve repair.    Study Detail: The following Echo studies were performed: 2D, M-Mode, Doppler and  color flow. Technically challenging study due to body habitus.      PHYSICIAN INTERPRETATION:  Left Ventricle: Left ventricular ejection fraction is mildly decreased, by visual estimate at 45%. Left venticular wall motion is abnormal. The left ventricular cavity size is normal. Abnormal (paradoxical) septal motion  consistent with post-operative status. Spectral Doppler shows a pseudonormal pattern of left ventricular diastolic filling.  LV Wall Scoring:  The basal and mid inferior wall is akinetic.    Left Atrium: The left atrium is mild to moderately dilated.  Right Ventricle: The right ventricle is normal in size. There is mildly reduced right ventricular systolic function. A device is visualized in the right ventricle.  Right Atrium: The right atrium is mild to moderately dilated. There is a device visualized in the right atrium.  Aortic Valve: The aortic valve is trileaflet. The aortic valve dimensionless index is 0.33. There is trace to mild aortic valve regurgitation. The peak instantaneous gradient of the aortic valve is 16.0 mmHg. The mean gradient of the aortic valve is 9.0 mmHg.  Mitral Valve: The mitral valve is mild to moderately thickened. Status post mitral annular ring repair. There is mild mitral valve regurgitation.  Tricuspid Valve: The tricuspid valve is structurally normal. Status post triscuspid annular ring repair. There is trace tricuspid regurgitation.  Pulmonic Valve: The pulmonic valve is not well visualized. The pulmonic valve regurgitation was not well visualized.  Pericardium: There is no pericardial effusion noted.  Aorta: The aortic root is normal.  In comparison to the previous echocardiogram(s): Compared with study dated 11/30/2022, no significant change.      CONCLUSIONS:  1. Basal and mid inferior wall is abnormal.  2. Left ventricular ejection fraction is mildly decreased, by visual estimate at 45%.  3. Abnormal left venticular wall motion.  4. Spectral Doppler shows a pseudonormal pattern of left ventricular diastolic filling.  5. Abnormal septal motion consistent with post-operative status.  6. There is mildly reduced right ventricular systolic function.  7. The left atrium is mild to moderately dilated.  8. The right atrium is mild to moderately dilated.    QUANTITATIVE DATA SUMMARY:  2D  MEASUREMENTS:  Normal Ranges:  LAs:           4.40 cm  (2.7-4.0cm)  IVSd:          0.90 cm  (0.6-1.1cm)  LVPWd:         0.90 cm  (0.6-1.1cm)  LVIDd:         5.60 cm  (3.9-5.9cm)  LVIDs:         4.00 cm  LV Mass Index: 91 g/m2  LVEDV Index:   70 ml/m2  LV % FS        28.6 %    LA VOLUME:  Normal Ranges:  LA Vol A4C:        73.3 ml    (22+/-6mL/m2)  LA Vol A2C:        80.2 ml  LA Vol BP:         79.4 ml  LA Vol Index A4C:  34.8ml/m2  LA Vol Index A2C:  38.1 ml/m2  LA Vol Index BP:   37.7 ml/m2  LA Area A4C:       21.3 cm2  LA Area A2C:       21.5 cm2  LA Major Axis A4C: 5.3 cm  LA Major Axis A2C: 4.9 cm  LA Volume Index:   37.7 ml/m2    RA VOLUME BY A/L METHOD:  Normal Ranges:  RA Area A4C: 17.7 cm2    AORTA MEASUREMENTS:  Normal Ranges:  Ao Sinus, d: 2.77 cm (2.1-3.5cm)  Ao STJ, d:   2.50 cm (1.7-3.4cm)  Asc Ao, d:   3.11 cm (2.1-3.4cm)    LV SYSTOLIC FUNCTION BY 2D PLANIMETRY (MOD):  Normal Ranges:  EF-A4C View:    56 % (>=55%)  EF-A2C View:    54 %  EF-Biplane:     56 %  EF-Visual:      45 %  LV EF Reported: 45 %    LV DIASTOLIC FUNCTION:  Normal Ranges:  MV Peak E:        1.40 m/s    (0.7-1.2 m/s)  MV Peak A:        0.91 m/s    (0.42-0.7 m/s)  E/A Ratio:        1.54        (1.0-2.2)  MV e'             0.065 m/s   (>8.0)  MV lateral e'     0.08 m/s  MV medial e'      0.05 m/s  MV A Dur:         153.00 msec  E/e' Ratio:       21.64       (<8.0)  PulmV Sys Selwyn:    29.40 cm/s  PulmV Ojeda Selwyn:   48.90 cm/s  PulmV S/D Selwyn:    0.60  PulmV A Revs Selwyn: 16.00 cm/s  PulmV A Revs Dur: 114.00 msec    MITRAL VALVE:  Normal Ranges:  MV Vmax:    1.67 m/s  (<=1.3m/s)  MV peak P.2 mmHg (<5mmHg)  MV mean P.0 mmHg  (<2mmHg)  MV DT:      436 msec  (150-240msec)    MITRAL INSUFFICIENCY:  Normal Ranges:  PISA Radius: 0.5 cm    AORTIC VALVE:  Normal Ranges:  AoV Vmax:                2.00 m/s  (<=1.7m/s)  AoV Peak P.0 mmHg (<20mmHg)  AoV Mean P.0 mmHg  (1.7-11.5mmHg)  LVOT Max Selwyn:            0.64  m/s  (<=1.1m/s)  AoV VTI:                 47.40 cm  (18-25cm)  LVOT VTI:                15.60 cm  LVOT Diameter:           2.00 cm   (1.8-2.4cm)  AoV Area, VTI:           1.03 cm2  (2.5-5.5cm2)  AoV Area,Vmax:           1.00 cm2  (2.5-4.5cm2)  AoV Dimensionless Index: 0.33    AORTIC INSUFFICIENCY:  AI Vmax:       3.76 m/s  AI Half-time:  688 msec  AI Decel Rate: 160.00 cm/s2      RIGHT VENTRICLE:  RV Basal 3.72 cm  RV Mid   2.21 cm  RV Major 8.8 cm  TAPSE:   15.2 mm    TRICUSPID VALVE/RVSP:  Normal Ranges:  Peak TR Velocity: 2.37 m/s  Est. RA Pressure: 3 mmHg  RV Syst Pressure: 25.5 mmHg (< 30mmHg)  IVC Diam:         1.03 cm    PULMONIC VALVE:  Normal Ranges:  PV Accel Time: 201 msec (>120ms)  PV Max Selwyn:    1.2 m/s  (0.6-0.9m/s)  PV Max P.0 mmHg    Pulmonary Veins:  PulmV A Revs Dur: 114.00 msec  PulmV A Revs Selwyn: 16.00 cm/s  PulmV Ojeda Selwyn:   48.90 cm/s  PulmV S/D Selwyn:    0.60  PulmV Sys Selwyn:    29.40 cm/s      [id number] Jose Goodwin MD  Electronically signed on 2024 at 1:27:10 PM      Wall Scoring        ** Final **      Guideline-Directed Medical Therapy:  -ARNI: Yes, describe: Entresto  mg BID  -Beta Blocker: Yes, describe: Metoprolol succinate 100 mg 1 tablet qAM and 0.5 tablets qPM  -MRA: Yes, describe: Spironolactone 25 mg every day   -SGLT2i: Yes, describe: Farxiga 10 mg every day     Secondary Prevention:  -The ASCVD Risk score (Cornell DK, et al., 2019) failed to calculate for the following reasons:    Risk score cannot be calculated because patient has a medical history suggesting prior/existing ASCVD   -Aspirin 81mg? yes  -Statin?: Yes, describe: Atorvastatin 80 mg every day   -HTN?: Yes, describe: Controlled - 107/61 as of 3/14/2025    CURRENT PHARMACOTHERAPY:   Farxiga 10 mg every day   Entresto  mg BID  Furosemide 20 mg every day   Metoprolol succinate 100 mg 1 tablet qAM and 0.5 tablets qPM  Spironolactone 25 mg every day     Affordability: Farxiga and Entresto create cost burden  for patient; previously ineligible for  PAP. Was able to afford copay at last fill.   Adherence/Compliance: patient confirms taking medications as prescribed; denies any missed doses  Adverse Effects: some dizziness when quickly standing up or bending over; resolves quickly. Sometimes occurs without position changes. Has been taking position changes slower to help with this.     Monitoring Weights at Home: Yes, daily  Home Weight Recordings: NA    Past In Office Weight Readings:   Wt Readings from Last 6 Encounters:   03/14/25 104 kg (230 lb)   02/13/25 104 kg (229 lb)   12/26/24 102 kg (225 lb)   11/26/24 105 kg (230 lb 9.6 oz)   08/06/24 104 kg (230 lb)   05/30/24 103 kg (227 lb)       Monitoring Blood Pressure at Home: Yes, confirms she does have a meter at home. Also monitors on her smart watch.  Home BP Recordings: ~100-110/60 prior to medication, ~90/60 after medication. Noted lower HR from her watch (47 bpm), occurs during the daytime about twice per week.    Past In Office BP Readings:   BP Readings from Last 6 Encounters:   03/14/25 107/61   02/13/25 102/66   11/26/24 97/66   08/06/24 97/60   05/30/24 100/59   03/15/24 110/56       HEALTH MANAGEMENT    Maintaining fluid restriction (<2 L/day): N/A  Edema/swelling: No  Shortness of breath: No, able to walk for 1 hour 3 times weekly without issue  Trouble sleeping/lying down: No  Dry/hacking cough: No  Recent Hospitalizations: No    EDUCATION/COUNSELING:   - Counseled patient on MOA, expectations, duration of therapy, contraindications, administration, and monitoring parameters  - Counseled patient on lifestyle modifications that can decrease your risk of having complications (smoking cessation, losing weight, daily weights, vaccines)  - Counseled patient on fluid intake and weight management. Recommended to not consume more than 2 liters of fliuids per day. If they have gained more than 2-3 pounds within a 24 hours period (or 5 pounds in a week), contact  their cardiologist  - Answered all patient questions and concerns      DISCUSSION/NOTES:   Today's appointment was 1 month follow up regarding heart failure pharmacotherapy.  Unfortunately, Ana was unable to apply for the [employer] as this application requires the 1040 form. She was able to afford the copays for Entresto/Farxiga at the last fill, as they have decreased since she likely met her deductible. Discussed the Medicare payment plan as well as evaluating prescription insurance plans during open enrollment.   No symptoms of worsening heart failure reported today. She does have intermittent dizziness, typically resolves quickly. Her smart watch alerts her of bradycardia about twice per week, recommended evaluating her heart rate when she takes her blood pressure as well. Advised to call cardiologist for consistent low readings or increase in dizziness.  Will not schedule follow up at this time, patient to call for any future questions/concerns.    ASSESSMENT:    Assessment/Plan   Problem List Items Addressed This Visit       Cardiomyopathy    Ana is prescribed 4/4 GDMT. No titrations recommended due to heart rate. Will continue current therapy unchanged.          RECOMMENDATIONS/PLAN:    Continue:   Farxiga 10 mg every day   Entresto  mg BID  Furosemide 20 mg every day   Metoprolol succinate 100 mg 1 tablet qAM and 0.5 tablets qPM  Spironolactone 25 mg every day   Follow up: None at this time    Last Appnt with Referring Provider: 2/13/2025  Next Appnt with Referring Provider: 8/19/2025  Clinical Pharmacist follow up: None at this time  VAF/Application Expiration: No  Type of Encounter: Alma Islas PharmD    Verbal consent to manage patient's drug therapy was obtained from the patient . They were informed they may decline to participate or withdraw from participation in pharmacy services at any time.    Continue all meds under the continuation of care with the referring provider and  clinical pharmacy team.

## 2025-04-25 ENCOUNTER — APPOINTMENT (OUTPATIENT)
Dept: PHARMACY | Facility: HOSPITAL | Age: 70
End: 2025-04-25
Payer: MEDICARE

## 2025-04-25 DIAGNOSIS — I42.9 CARDIOMYOPATHY, UNSPECIFIED TYPE (MULTI): ICD-10-CM

## 2025-04-25 NOTE — Clinical Note
Chip Perez, Unfortunately, Ana was unable to apply for the [employer] as this application requires the 1040 form. She was able to afford the copays for Entresto/Farxiga at the last fill, as they have decreased since she likely met her deductible. Discussed the Medicare payment plan as well as evaluating prescription insurance plans during open enrollment. No symptoms of worsening heart failure reported today. She does have intermittent dizziness, typically resolves quickly. Her smart watch alerts her of bradycardia about twice per week, recommended evaluating her heart rate when she takes her blood pressure as well. Advised to call if she notes consistent low readings or increase in dizziness. No follow up scheduled at this time, patient given my phone number for any future questions/concerns.

## 2025-04-25 NOTE — ASSESSMENT & PLAN NOTE
Ana is prescribed 4/4 GDMT. No titrations recommended due to heart rate. Will continue current therapy unchanged.

## 2025-05-01 ENCOUNTER — HOSPITAL ENCOUNTER (OUTPATIENT)
Dept: CARDIOLOGY | Facility: CLINIC | Age: 70
Discharge: HOME | End: 2025-05-01
Payer: MEDICARE

## 2025-05-01 DIAGNOSIS — I47.29 OTHER VENTRICULAR TACHYCARDIA: ICD-10-CM

## 2025-05-01 DIAGNOSIS — Z95.810 PRESENCE OF AUTOMATIC (IMPLANTABLE) CARDIAC DEFIBRILLATOR: ICD-10-CM

## 2025-05-01 PROCEDURE — 93282 PRGRMG EVAL IMPLANTABLE DFB: CPT | Performed by: INTERNAL MEDICINE

## 2025-05-01 PROCEDURE — 93282 PRGRMG EVAL IMPLANTABLE DFB: CPT

## 2025-05-07 ENCOUNTER — PATIENT MESSAGE (OUTPATIENT)
Dept: CARDIOLOGY | Facility: HOSPITAL | Age: 70
End: 2025-05-07
Payer: MEDICARE

## 2025-05-07 DIAGNOSIS — I50.22 CHRONIC SYSTOLIC CONGESTIVE HEART FAILURE: ICD-10-CM

## 2025-05-07 DIAGNOSIS — I42.9 CARDIOMYOPATHY, UNSPECIFIED TYPE (MULTI): ICD-10-CM

## 2025-05-07 DIAGNOSIS — E78.5 HYPERLIPIDEMIA, UNSPECIFIED: ICD-10-CM

## 2025-05-07 RX ORDER — ATORVASTATIN CALCIUM 80 MG/1
80 TABLET, FILM COATED ORAL NIGHTLY
Qty: 90 TABLET | Refills: 3 | Status: SHIPPED | OUTPATIENT
Start: 2025-05-07 | End: 2026-05-07

## 2025-05-07 RX ORDER — METOPROLOL SUCCINATE 100 MG/1
TABLET, EXTENDED RELEASE ORAL
Qty: 135 TABLET | Refills: 3 | Status: SHIPPED | OUTPATIENT
Start: 2025-05-07

## 2025-05-07 RX ORDER — SPIRONOLACTONE 25 MG/1
25 TABLET ORAL DAILY
Qty: 90 TABLET | Refills: 3 | Status: SHIPPED | OUTPATIENT
Start: 2025-05-07 | End: 2026-05-07

## 2025-05-08 DIAGNOSIS — M79.642 BILATERAL HAND PAIN: ICD-10-CM

## 2025-05-08 DIAGNOSIS — M79.641 BILATERAL HAND PAIN: ICD-10-CM

## 2025-05-08 RX ORDER — GABAPENTIN 100 MG/1
100 CAPSULE ORAL 3 TIMES DAILY
Qty: 270 CAPSULE | Refills: 1 | Status: SHIPPED | OUTPATIENT
Start: 2025-05-08 | End: 2026-05-08

## 2025-05-12 DIAGNOSIS — I50.9 HEART FAILURE, UNSPECIFIED: Primary | ICD-10-CM

## 2025-05-12 RX ORDER — FUROSEMIDE 20 MG/1
20 TABLET ORAL DAILY
Qty: 90 TABLET | Refills: 3 | Status: SHIPPED | OUTPATIENT
Start: 2025-05-12 | End: 2026-05-12

## 2025-05-12 NOTE — TELEPHONE ENCOUNTER
Patient called to check on her refills. Per Kay/[facility], Atorvastatin is ready, She has scripts for Metoprolol and Spironolactone and will prepare them. She did not have a script for Furosemide 20 daily, which I resubmitted. I advised patient of the aforementioned and will ask KEI Perez to sign script today, as patient has been out of Furosemide for 2 days.

## 2025-05-21 NOTE — PROGRESS NOTES
"  Pharmacist Clinic: Cardiology Management    Ana Cavanaugh is a 69 y.o. female was referred to Clinical Pharmacy Team for weight loss management.     Referring Provider: Gini Bowers APRN*    THIS IS A NEW PATIENT APPOINTMENT for weight loss. PATIENT has been ESTABLISHed WITH CLINICAL PHARMACY for heart failure with Huma Islas last visit 4/25/25.     Appointment was completed by Ana who was reached at primary number .    REVIEW OF PAST APPNT (IF APPLICABLE):   Patient had no symptoms of worsening heart failure at last appointment. She does have intermittent dizziness, typically resolves quickly, and bradycardia about twice per week.   Unfortunately patient has not been eligible for  PAP or other grants due to not filing taxes.    Allergies[1]    Medical History[2]    Medications Ordered Prior to Encounter[3]      RELEVANT LAB RESULTS:  Lab Results   Component Value Date    BILITOT 0.5 08/06/2024    CALCIUM 9.8 12/26/2024    CO2 29 12/26/2024     12/26/2024    CREATININE 0.92 12/26/2024    GLUCOSE 98 12/26/2024    ALKPHOS 76 08/06/2024    K 4.6 12/26/2024    PROT 7.2 08/06/2024     12/26/2024    AST 30 08/06/2024    ALT 26 08/06/2024    BUN 20 12/26/2024    ANIONGAP 11 12/26/2024    MG 2.0 08/02/2023    PHOS 2.8 09/27/2022     (H) 08/06/2021    ALBUMIN 4.1 08/06/2024    LIPASE 33 05/29/2021    GFRF CANCELED 08/17/2023    GFRMALE CANCELED 08/17/2023     Lab Results   Component Value Date    TRIG 79 08/06/2024    CHOL 114 08/06/2024    LDLCALC 53 08/06/2024    HDL 45.1 08/06/2024     No results found for: \"BMCBC\", \"CBCDIF\"     PHARMACEUTICAL ASSESSMENT:    MEDICATION RECONCILIATION    Was a medication reconciliation completed at this visit? No  Home Pharmacy Reviewed? Yes, describe: [facility]   Drug Interactions? No    Medication Documentation Review Audit       Reviewed by Huma Islas PharmD (Pharmacist) on 03/14/25 at 1448      Medication Order Taking? Sig Documenting Provider Last " Dose Status   acetaminophen (Tylenol 8 HOUR) 650 mg ER tablet [id number] Yes Take 2 tablets (1,300 mg) by mouth every 12 hours. Do not crush, chew, or split. Historical Provider, MD  Active   aspirin 81 mg EC tablet [id number] Yes Take 1 tablet (81 mg) by mouth once daily. ZIYAD Rapp  Active   atorvastatin (Lipitor) 80 mg tablet [id number] Yes TAKE 1 TABLET BY MOUTH EVERYDAY AT BEDTIME ZIYAD Rapp  Active   cholecalciferol (Vitamin D-3) 25 MCG (1000 UT) capsule [id number] Yes Take 1 capsule (25 mcg) by mouth once daily. Historical Provider, MD  Active   dapagliflozin propanediol (Farxiga) 10 mg [id number] Yes Take 1 tablet (10 mg) by mouth once daily. ZIYAD Rapp  Active   Entresto  mg tablet [id number] Yes TAKE 1 TABLET BY MOUTH TWICE A DAY ZIYAD Rapp  Active   furosemide (Lasix) 20 mg tablet [id number] Yes TAKE 1 TABLET BY MOUTH EVERY DAY ZIYAD Rapp  Active   gabapentin (Neurontin) 100 mg capsule [id number] Yes Take 1 capsule (100 mg) by mouth 3 times a day. Harsha Chu MD  Active   Klor-Con M10 10 mEq ER tablet [id number]  TAKE 1 TABLET BY MOUTH EVERY DAY   Patient not taking: Reported on 3/14/2025    Jose Goodwin MD  Active   metoprolol succinate XL (Toprol-XL) 100 mg 24 hr tablet [id number] Yes TAKE 1 TABLET (100 MG) IN AM AND 1/2 TABLET (50 MG) IN PM ZIYAD Rapp  Active   multivitamin-Ca-iron-minerals (Tab-A-Michelle Womens) 27-0.4 mg tablet [id number] Yes Take 1 tablet by mouth once daily. Historical Provider, MD  Active   omeprazole (PriLOSEC) 20 mg DR capsule [id number] Yes Take 1 capsule (20 mg) by mouth once daily. Jose Goodwin MD  Active   spironolactone (Aldactone) 25 mg tablet [id number] Yes TAKE 1 TABLET BY MOUTH EVERY DAY Jose Goodwin MD  Active                    DISEASE MANAGEMENT ASSESSMENT:     WEIGHT LOSS ASSESSMENT     Wt Readings from Last 3 Encounters:   03/14/25 104 kg (230 lb)   02/13/25 104 kg  (229 lb)   12/26/24 102 kg (225 lb)     BMI Readings from Last 3 Encounters:   03/14/25 37.12 kg/m²   02/13/25 36.96 kg/m²   12/26/24 36.32 kg/m²       Recorded Home Weight(s): 242 lbs   Diet:   1-2 meals   Breakfast: coffee, sandwich or bread/muffin  Dinner: salmon/chicken, rice/potatoes, greens  Snacks: peaches, a lot fruit, chips/popcorn   No pop   Stress eater   Exercise Routine: 2 days per week goes to an exercise class, 2 times a week on treadmill     Relevant PMH:  PMH of Pancreatitis? No  PMH of Retinopathy? No  PMH of MTC? No    PATIENT EDUCATION/GOALS    Wegovy must be refrigerated. If necessary, the pen may be kept at room temperature for up to 28 days. Each box comes with 4 pens, one for each week.     1.  Remove the pen from the refrigerator. Keep the pen cap on until you are ready to inject.   2.  Check the pen label to make sure that it is the correct medication and dose. Also check to make sure that the solution is not cloudy, discolored or have particles in it.   3. Prior to administration wash your hands.   4. Choose your injection site either your abdomen or thigh (if someone else is giving the injection the upper arm can also be used).   5. Ensure to change (rotate) injection sites each week. You can use the same area of the body but inject in a different part of that area.   6. Once the injection site has been selected use an alcohol swab to clean off the area.   7. Remove the grey cap and press firmly onto the injection site.   8. Push the pen against the skin until the yellow bar has stopped moving. You will hear two clicks; one indicates that the injection has started, and the other indicates an ongoing injection. The injection process will take about 10 seconds.  9. Do not rub the area after administration   10. Remove pen and discard in a sharps container (such as a milk jug, detergent bottle, or coffee canister)   11. Injections should be done on the same day each week, if you forget you  have up to 5 days to take your dose.    -  Counseled patient on Wegovy MOA, expectations, side effects, duration of therapy, administration, and monitoring parameters.  - Advised patient that they may experience improved satiety after meals and portion sizes of meals may be reduced as doses of Wegovy increase.  - Counseled patient to avoid foods that are fatty/oily as this may precipitate the nausea/GI upset that may occur with new start Wegovy.      COUNSELING:   - Counseled patient on MOA, expectations, side effects, duration of therapy, contraindications, administration, and monitoring parameters  - Answered all patient questions and concerns; provided [facility] phone number if issues/questions arise       DISCUSSION/NOTES:   Patient has requested additional help with weight loss. She eats balanced meals and exercises multiple times a week but cannot lose additional weight. Patient does snack when she is stressed and is trying to work on that but otherwise is very healthy.   Patient has CAD and would benefit from a GLP1 for cardiac protection and weight loss.   Her insurance company requires a PA for Wegovy but will send rx for Wegovy 0.25mg subcutaneous q7d to try and get PA approved.   Patient does not qualify for  PAP because her  does not file taxes.   Will follow up in 1 week to determine cost of medication and if PA approved.     ASSESSMENT:    Assessment/Plan   Problem List Items Addressed This Visit       Cardiomyopathy - Primary    Relevant Medications    semaglutide, weight loss, (Wegovy) 0.25 mg/0.5 mL pen injector    Myocardial infarction in recovery phase (Multi)    Start Wegovy 0.25mg subcutaneous weekly for cardiac protection          Relevant Medications    semaglutide, weight loss, (Wegovy) 0.25 mg/0.5 mL pen injector    Obesity    Relevant Medications    semaglutide, weight loss, (Wegovy) 0.25 mg/0.5 mL pen injector    Other Relevant Orders    Referral to Clinical Pharmacy          RECOMMENDATIONS/PLAN:    START  Wegovy 0.25mg subcutaneous weekly   Follow up in 1 week to determine cost of if PA is approved.     Last Appnt with Referring Provider: 2/13/25  Next Appnt with Referring Provider: 8/19/25  Clinical Pharmacist follow up: 5/30/25  Type of Encounter: Alma Rob, Nik    Verbal consent to manage patient's drug therapy was obtained from the patient . They were informed they may decline to participate or withdraw from participation in pharmacy services at any time.    Continue all meds under the continuation of care with the referring provider and clinical pharmacy team.           [1]   Allergies  Allergen Reactions    Hay Fever And Allergy Relief Itching and Runny nose   [2] No past medical history on file.  [3]   Current Outpatient Medications on File Prior to Visit   Medication Sig Dispense Refill    acetaminophen (Tylenol 8 HOUR) 650 mg ER tablet Take 2 tablets (1,300 mg) by mouth every 12 hours. Do not crush, chew, or split.      aspirin 81 mg EC tablet Take 1 tablet (81 mg) by mouth once daily. 90 tablet 3    atorvastatin (Lipitor) 80 mg tablet Take 1 tablet (80 mg) by mouth once daily at bedtime. 90 tablet 3    cholecalciferol (Vitamin D-3) 25 MCG (1000 UT) capsule Take 1 capsule (25 mcg) by mouth once daily.      dapagliflozin propanediol (Farxiga) 10 mg tablet Take 1 tablet (10 mg) by mouth once daily. 90 tablet 3    furosemide (Lasix) 20 mg tablet Take 1 tablet (20 mg) by mouth once daily. 90 tablet 3    gabapentin (Neurontin) 100 mg capsule Take 1 capsule (100 mg) by mouth 3 times a day. 270 capsule 1    Klor-Con M10 10 mEq ER tablet TAKE 1 TABLET BY MOUTH EVERY DAY (Patient not taking: Reported on 3/14/2025) 90 tablet 3    metoprolol succinate XL (Toprol-XL) 100 mg 24 hr tablet TAKE 1 TABLET (100 MG) IN AM AND 1/2 TABLET (50 MG) IN  tablet 3    multivitamin-Ca-iron-minerals (Tab-A-Michelle Womens) 27-0.4 mg tablet Take 1 tablet by mouth once  daily.      omeprazole (PriLOSEC) 20 mg DR capsule Take 1 capsule (20 mg) by mouth once daily. 90 capsule 3    sacubitriL-valsartan (Entresto)  mg tablet Take 1 tablet by mouth 2 times a day. 180 tablet 3    spironolactone (Aldactone) 25 mg tablet Take 1 tablet (25 mg) by mouth once daily. 90 tablet 3     No current facility-administered medications on file prior to visit.

## 2025-05-23 ENCOUNTER — APPOINTMENT (OUTPATIENT)
Dept: PHARMACY | Facility: HOSPITAL | Age: 70
End: 2025-05-23
Payer: MEDICARE

## 2025-05-23 DIAGNOSIS — E66.01 CLASS 2 SEVERE OBESITY WITH SERIOUS COMORBIDITY AND BODY MASS INDEX (BMI) OF 37.0 TO 37.9 IN ADULT, UNSPECIFIED OBESITY TYPE: ICD-10-CM

## 2025-05-23 DIAGNOSIS — I21.9 MYOCARDIAL INFARCTION IN RECOVERY PHASE (MULTI): ICD-10-CM

## 2025-05-23 DIAGNOSIS — E66.812 CLASS 2 SEVERE OBESITY WITH SERIOUS COMORBIDITY AND BODY MASS INDEX (BMI) OF 37.0 TO 37.9 IN ADULT, UNSPECIFIED OBESITY TYPE: ICD-10-CM

## 2025-05-23 DIAGNOSIS — I42.9 CARDIOMYOPATHY, UNSPECIFIED TYPE (MULTI): Primary | ICD-10-CM

## 2025-05-23 PROCEDURE — RXMED WILLOW AMBULATORY MEDICATION CHARGE

## 2025-05-23 RX ORDER — SEMAGLUTIDE 0.25 MG/.5ML
0.25 INJECTION, SOLUTION SUBCUTANEOUS WEEKLY
Qty: 2 ML | Refills: 1 | Status: SHIPPED | OUTPATIENT
Start: 2025-05-23

## 2025-05-23 NOTE — Clinical Note
Chip, Spoke with Ana today regarding starting a GLP1. With her cardiac history it is likely we can get Wegovy approved. Although it may still be expensive with PA approval. Patient is aware we will talk next week and determine if medication is feasible for her. She is not eligible for PAP because her  does not file taxes. Will keep you up to date on PA decision. Thank you for the consult.

## 2025-05-24 ENCOUNTER — PHARMACY VISIT (OUTPATIENT)
Dept: PHARMACY | Facility: CLINIC | Age: 70
End: 2025-05-24
Payer: COMMERCIAL

## 2025-05-28 NOTE — PROGRESS NOTES
"  Pharmacist Clinic: Cardiology Management    Ana Cavanaugh is a 69 y.o. female was referred to Clinical Pharmacy Team for weight loss management.     Referring Provider: Gini Bowers APRN*    THIS IS A FOLLOW UP PATIENT APPOINTMENT. AT LAST VISIT ON 5/23/25 WITH PHARMACIST (Rosalie Rob).    Appointment was completed by Ana who was reached at primary number.    REVIEW OF PAST APPNT (IF APPLICABLE):   Patient has requested additional help with weight loss. She eats balanced meals and exercises multiple times a week but cannot lose additional weight. Patient does snack when she is stressed and is trying to work on that but otherwise is very healthy.   Patient has CAD and would benefit from a GLP1 for cardiac protection and weight loss.   Her insurance company requires a PA for Wegovy but will send rx for Wegovy 0.25mg subcutaneous q7d to try and get PA approved.       Allergies Reviewed? No    Allergies[1]    Medical History[2]    Medications Ordered Prior to Encounter[3]      RELEVANT LAB RESULTS:  Lab Results   Component Value Date    BILITOT 0.5 08/06/2024    CALCIUM 9.8 12/26/2024    CO2 29 12/26/2024     12/26/2024    CREATININE 0.92 12/26/2024    GLUCOSE 98 12/26/2024    ALKPHOS 76 08/06/2024    K 4.6 12/26/2024    PROT 7.2 08/06/2024     12/26/2024    AST 30 08/06/2024    ALT 26 08/06/2024    BUN 20 12/26/2024    ANIONGAP 11 12/26/2024    MG 2.0 08/02/2023    PHOS 2.8 09/27/2022     (H) 08/06/2021    ALBUMIN 4.1 08/06/2024    LIPASE 33 05/29/2021    GFRF CANCELED 08/17/2023    GFRMALE CANCELED 08/17/2023     Lab Results   Component Value Date    TRIG 79 08/06/2024    CHOL 114 08/06/2024    LDLCALC 53 08/06/2024    HDL 45.1 08/06/2024     No results found for: \"BMCBC\", \"CBCDIF\"     PHARMACEUTICAL ASSESSMENT:    MEDICATION RECONCILIATION    Was a medication reconciliation completed at this visit? No  Home Pharmacy Reviewed? Yes, describe: [facility]     Drug Interactions? " No    Medication Documentation Review Audit       Reviewed by Huma Islas PharmD (Pharmacist) on 03/14/25 at 1448      Medication Order Taking? Sig Documenting Provider Last Dose Status   acetaminophen (Tylenol 8 HOUR) 650 mg ER tablet [id number] Yes Take 2 tablets (1,300 mg) by mouth every 12 hours. Do not crush, chew, or split. Historical Provider, MD  Active   aspirin 81 mg EC tablet [id number] Yes Take 1 tablet (81 mg) by mouth once daily. ZIYAD Rapp  Active   atorvastatin (Lipitor) 80 mg tablet [id number] Yes TAKE 1 TABLET BY MOUTH EVERYDAY AT BEDTIME ZIYAD Rapp  Active   cholecalciferol (Vitamin D-3) 25 MCG (1000 UT) capsule [id number] Yes Take 1 capsule (25 mcg) by mouth once daily. Historical Provider, MD  Active   dapagliflozin propanediol (Farxiga) 10 mg [id number] Yes Take 1 tablet (10 mg) by mouth once daily. ZIYAD Rapp  Active   Entresto  mg tablet [id number] Yes TAKE 1 TABLET BY MOUTH TWICE A DAY ZIYAD Rapp  Active   furosemide (Lasix) 20 mg tablet [id number] Yes TAKE 1 TABLET BY MOUTH EVERY DAY ZIYAD Rapp  Active   gabapentin (Neurontin) 100 mg capsule [id number] Yes Take 1 capsule (100 mg) by mouth 3 times a day. Harsha Chu MD  Active   Klor-Con M10 10 mEq ER tablet [id number]  TAKE 1 TABLET BY MOUTH EVERY DAY   Patient not taking: Reported on 3/14/2025    Jose Goodwin MD  Active   metoprolol succinate XL (Toprol-XL) 100 mg 24 hr tablet [id number] Yes TAKE 1 TABLET (100 MG) IN AM AND 1/2 TABLET (50 MG) IN PM ZIYAD Rapp  Active   multivitamin-Ca-iron-minerals (Tab-A-Michelle Womens) 27-0.4 mg tablet [id number] Yes Take 1 tablet by mouth once daily. Historical Provider, MD  Active   omeprazole (PriLOSEC) 20 mg DR capsule [id number] Yes Take 1 capsule (20 mg) by mouth once daily. Jose Goodwin MD  Active   spironolactone (Aldactone) 25 mg tablet [id number] Yes TAKE 1 TABLET BY MOUTH EVERY DAY Jose Goodwni,  MD  Active                    DISEASE MANAGEMENT ASSESSMENT:     WEIGHT LOSS ASSESSMENT     Wt Readings from Last 3 Encounters:   03/14/25 104 kg (230 lb)   02/13/25 104 kg (229 lb)   12/26/24 102 kg (225 lb)     BMI Readings from Last 3 Encounters:   03/14/25 37.12 kg/m²   02/13/25 36.96 kg/m²   12/26/24 36.32 kg/m²       Diet:   1-2 meals   Breakfast: coffee, sandwich or bread/muffin  Dinner: salmon/chicken, rice/potatoes, greens  Snacks: peaches, a lot fruit, chips/popcorn   No pop   Stress eater   Exercise Routine: 2 days per week goes to an exercise class, 2 times a week on treadmill     CURRENT PHARMACOTHERAPY   Wegovy 0.25mg subcutaneous weekly (hasn't started)    Affordability/Accessibility: $58/month     Relevant PMH:  PMH of Pancreatitis? No  PMH of Retinopathy? No  PMH of MTC? No    PATIENT EDUCATION/GOALS  Wegovy must be refrigerated. If necessary, the pen may be kept at room temperature for up to 28 days. Each box comes with 4 pens, one for each week.     1.  Remove the pen from the refrigerator. Keep the pen cap on until you are ready to inject.   2.  Check the pen label to make sure that it is the correct medication and dose. Also check to make sure that the solution is not cloudy, discolored or have particles in it.   3. Prior to administration wash your hands.   4. Choose your injection site either your abdomen or thigh (if someone else is giving the injection the upper arm can also be used).   5. Ensure to change (rotate) injection sites each week. You can use the same area of the body but inject in a different part of that area.   6. Once the injection site has been selected use an alcohol swab to clean off the area.   7. Remove the grey cap and press firmly onto the injection site.   8. Push the pen against the skin until the yellow bar has stopped moving. You will hear two clicks; one indicates that the injection has started, and the other indicates an ongoing injection. The injection process  will take about 10 seconds.  9. Do not rub the area after administration   10. Remove pen and discard in a sharps container (such as a milk jug, detergent bottle, or coffee canister)   11. Injections should be done on the same day each week, if you forget you have up to 5 days to take your dose.    -  Counseled patient on Wegovy MOA, expectations, side effects, duration of therapy, administration, and monitoring parameters.  - Advised patient that they may experience improved satiety after meals and portion sizes of meals may be reduced as doses of Wegovy increase.  - Counseled patient to avoid foods that are fatty/oily as this may precipitate the nausea/GI upset that may occur with new start Wegovy.      COUNSELING:   - Counseled patient on MOA, expectations, side effects, duration of therapy, contraindications, administration, and monitoring parameters  - Answered all patient questions and concerns; provided [facility] phone number if issues/questions arise        DISCUSSION/NOTES:   Patient received medication and is agreeable to monthly copay.   She has not started medication as she was waiting for us to speak.   Discussed dosing/administration/ADRs with patient regarding Wegovy.   Patient is now going to start medication and will call [facility] with any questions/concerns.  Follow up in 1 month in hopes to increase dose for increased weight loss benefit.     ASSESSMENT:    Assessment/Plan   Problem List Items Addressed This Visit       Myocardial infarction in recovery phase (Multi) - Primary    Start Wegovy 0.25mg subcutaneous weekly for cardiac protection          Obesity    Relevant Orders    Referral to Clinical Pharmacy         RECOMMENDATIONS/PLAN:    CONTINUE  Wegovy 0.25mg subcutaneous weekly   Follow up in 1 month in hopes to continue to titrate medication     Last Appnt with Referring Provider: 2/13/25  Next Appnt with Referring Provider: 8/19/25  Clinical Pharmacist follow up: 6/27/25  Type of Encounter:  Virtual    Rosalie Rob PharmD    Verbal consent to manage patient's drug therapy was obtained from the patient . They were informed they may decline to participate or withdraw from participation in pharmacy services at any time.    Continue all meds under the continuation of care with the referring provider and clinical pharmacy team.           [1]   Allergies  Allergen Reactions    Hay Fever And Allergy Relief Itching and Runny nose   [2] No past medical history on file.  [3]   Current Outpatient Medications on File Prior to Visit   Medication Sig Dispense Refill    acetaminophen (Tylenol 8 HOUR) 650 mg ER tablet Take 2 tablets (1,300 mg) by mouth every 12 hours. Do not crush, chew, or split.      aspirin 81 mg EC tablet Take 1 tablet (81 mg) by mouth once daily. 90 tablet 3    atorvastatin (Lipitor) 80 mg tablet Take 1 tablet (80 mg) by mouth once daily at bedtime. 90 tablet 3    cholecalciferol (Vitamin D-3) 25 MCG (1000 UT) capsule Take 1 capsule (25 mcg) by mouth once daily.      dapagliflozin propanediol (Farxiga) 10 mg tablet Take 1 tablet (10 mg) by mouth once daily. 90 tablet 3    furosemide (Lasix) 20 mg tablet Take 1 tablet (20 mg) by mouth once daily. 90 tablet 3    gabapentin (Neurontin) 100 mg capsule Take 1 capsule (100 mg) by mouth 3 times a day. 270 capsule 1    Klor-Con M10 10 mEq ER tablet TAKE 1 TABLET BY MOUTH EVERY DAY (Patient not taking: Reported on 3/14/2025) 90 tablet 3    metoprolol succinate XL (Toprol-XL) 100 mg 24 hr tablet TAKE 1 TABLET (100 MG) IN AM AND 1/2 TABLET (50 MG) IN  tablet 3    multivitamin-Ca-iron-minerals (Tab-A-Michelle Womens) 27-0.4 mg tablet Take 1 tablet by mouth once daily.      omeprazole (PriLOSEC) 20 mg DR capsule Take 1 capsule (20 mg) by mouth once daily. 90 capsule 3    sacubitriL-valsartan (Entresto)  mg tablet Take 1 tablet by mouth 2 times a day. 180 tablet 3    semaglutide, weight loss, (Wegovy) 0.25 mg/0.5 mL pen injector Inject 0.25 mg  under the skin 1 (one) time per week. 2 mL 1    spironolactone (Aldactone) 25 mg tablet Take 1 tablet (25 mg) by mouth once daily. 90 tablet 3     No current facility-administered medications on file prior to visit.

## 2025-05-30 ENCOUNTER — APPOINTMENT (OUTPATIENT)
Dept: PHARMACY | Facility: HOSPITAL | Age: 70
End: 2025-05-30
Payer: MEDICARE

## 2025-05-30 DIAGNOSIS — I21.9 MYOCARDIAL INFARCTION IN RECOVERY PHASE (MULTI): Primary | ICD-10-CM

## 2025-05-30 DIAGNOSIS — E66.812 CLASS 2 SEVERE OBESITY WITH SERIOUS COMORBIDITY AND BODY MASS INDEX (BMI) OF 37.0 TO 37.9 IN ADULT, UNSPECIFIED OBESITY TYPE: ICD-10-CM

## 2025-05-30 DIAGNOSIS — E66.01 CLASS 2 SEVERE OBESITY WITH SERIOUS COMORBIDITY AND BODY MASS INDEX (BMI) OF 37.0 TO 37.9 IN ADULT, UNSPECIFIED OBESITY TYPE: ICD-10-CM

## 2025-05-30 NOTE — Clinical Note
Ana Saunders's PA for Ehgovy was approved and she is ok with copay. She has medication at home and is going to start now that we talked. We will follow up in a month to monitor how she is tolerating the medication and hopefully continue to titrate the dosing up.

## 2025-06-02 ENCOUNTER — APPOINTMENT (OUTPATIENT)
Dept: OBSTETRICS AND GYNECOLOGY | Facility: CLINIC | Age: 70
End: 2025-06-02
Payer: COMMERCIAL

## 2025-06-02 VITALS
WEIGHT: 233 LBS | BODY MASS INDEX: 37.45 KG/M2 | HEIGHT: 66 IN | SYSTOLIC BLOOD PRESSURE: 100 MMHG | DIASTOLIC BLOOD PRESSURE: 65 MMHG | HEART RATE: 61 BPM

## 2025-06-02 DIAGNOSIS — Z12.31 VISIT FOR SCREENING MAMMOGRAM: ICD-10-CM

## 2025-06-02 DIAGNOSIS — Z01.419 WELL WOMAN EXAM WITH ROUTINE GYNECOLOGICAL EXAM: Primary | ICD-10-CM

## 2025-06-02 PROCEDURE — G0101 CA SCREEN;PELVIC/BREAST EXAM: HCPCS | Performed by: OBSTETRICS & GYNECOLOGY

## 2025-06-02 PROCEDURE — 3008F BODY MASS INDEX DOCD: CPT | Performed by: OBSTETRICS & GYNECOLOGY

## 2025-06-02 PROCEDURE — 3078F DIAST BP <80 MM HG: CPT | Performed by: OBSTETRICS & GYNECOLOGY

## 2025-06-02 PROCEDURE — 1160F RVW MEDS BY RX/DR IN RCRD: CPT | Performed by: OBSTETRICS & GYNECOLOGY

## 2025-06-02 PROCEDURE — 1036F TOBACCO NON-USER: CPT | Performed by: OBSTETRICS & GYNECOLOGY

## 2025-06-02 PROCEDURE — 1159F MED LIST DOCD IN RCRD: CPT | Performed by: OBSTETRICS & GYNECOLOGY

## 2025-06-02 PROCEDURE — 3074F SYST BP LT 130 MM HG: CPT | Performed by: OBSTETRICS & GYNECOLOGY

## 2025-06-02 SDOH — ECONOMIC STABILITY: FOOD INSECURITY: WITHIN THE PAST 12 MONTHS, THE FOOD YOU BOUGHT JUST DIDN'T LAST AND YOU DIDN'T HAVE MONEY TO GET MORE.: NEVER TRUE

## 2025-06-02 SDOH — ECONOMIC STABILITY: INCOME INSECURITY: IN THE LAST 12 MONTHS, WAS THERE A TIME WHEN YOU WERE NOT ABLE TO PAY THE MORTGAGE OR RENT ON TIME?: NO

## 2025-06-02 SDOH — ECONOMIC STABILITY: FOOD INSECURITY: WITHIN THE PAST 12 MONTHS, YOU WORRIED THAT YOUR FOOD WOULD RUN OUT BEFORE YOU GOT MONEY TO BUY MORE.: NEVER TRUE

## 2025-06-02 SDOH — ECONOMIC STABILITY: TRANSPORTATION INSECURITY
IN THE PAST 12 MONTHS, HAS THE LACK OF TRANSPORTATION KEPT YOU FROM MEDICAL APPOINTMENTS OR FROM GETTING MEDICATIONS?: NO

## 2025-06-02 SDOH — ECONOMIC STABILITY: TRANSPORTATION INSECURITY
IN THE PAST 12 MONTHS, HAS LACK OF TRANSPORTATION KEPT YOU FROM MEETINGS, WORK, OR FROM GETTING THINGS NEEDED FOR DAILY LIVING?: NO

## 2025-06-02 ASSESSMENT — LIFESTYLE VARIABLES
HOW OFTEN DO YOU HAVE SIX OR MORE DRINKS ON ONE OCCASION: NEVER
HOW OFTEN DO YOU HAVE A DRINK CONTAINING ALCOHOL: NEVER
AUDIT-C TOTAL SCORE: 0
SKIP TO QUESTIONS 9-10: 1
HOW MANY STANDARD DRINKS CONTAINING ALCOHOL DO YOU HAVE ON A TYPICAL DAY: PATIENT DOES NOT DRINK

## 2025-06-02 ASSESSMENT — SOCIAL DETERMINANTS OF HEALTH (SDOH)
WITHIN THE LAST YEAR, HAVE YOU BEEN KICKED, HIT, SLAPPED, OR OTHERWISE PHYSICALLY HURT BY YOUR PARTNER OR EX-PARTNER?: NO
WITHIN THE LAST YEAR, HAVE YOU BEEN HUMILIATED OR EMOTIONALLY ABUSED IN OTHER WAYS BY YOUR PARTNER OR EX-PARTNER?: NO
WITHIN THE LAST YEAR, HAVE TO BEEN RAPED OR FORCED TO HAVE ANY KIND OF SEXUAL ACTIVITY BY YOUR PARTNER OR EX-PARTNER?: NO
WITHIN THE LAST YEAR, HAVE YOU BEEN AFRAID OF YOUR PARTNER OR EX-PARTNER?: NO
HOW HARD IS IT FOR YOU TO PAY FOR THE VERY BASICS LIKE FOOD, HOUSING, MEDICAL CARE, AND HEATING?: NOT HARD AT ALL

## 2025-06-02 ASSESSMENT — PATIENT HEALTH QUESTIONNAIRE - PHQ9
1. LITTLE INTEREST OR PLEASURE IN DOING THINGS: NOT AT ALL
2. FEELING DOWN, DEPRESSED OR HOPELESS: NOT AT ALL
SUM OF ALL RESPONSES TO PHQ9 QUESTIONS 1 AND 2: 0

## 2025-06-02 NOTE — ADDENDUM NOTE
Addended by: HAKEEM STONE on: 6/2/2025 09:17 AM     Modules accepted: Orders     Agree with plan for follow-up.

## 2025-06-02 NOTE — PROGRESS NOTES
"Ana Cavanaugh is a 69 y.o. female who is here for a annual exam.     Patient denies any vaginal bleeding    Menopause symptoms: Having symptoms but is doing well    Sexually active: Postmenopausal.  Active no concerns    Regular self breast exam: yes  no concerns on her exams    Menstrual History:  OB History          6    Para   5    Term   0            AB   1    Living   5         SAB        IAB        Ectopic   1    Multiple        Live Births                    Patient's last menstrual period was 12/10/2021.         Review of Systems  All Normal Review of Systems  Constitutional: no fever, no chills, no recent weight gain, no recent weight loss and no fatigue.    Gastrointestinal: no abdominal pain, no constipation, no nausea, no diarrhea and no vomiting.    Genitourinary: no dysuria, no urinary incontinence, no vaginal dryness, no vaginal itching, no dyspareunia, no pelvic pain, no dysmenorrhea, no sexual problems, no change in urinary frequency, no vaginal discharge, no unexplained vaginal bleeding and no lesion/sore.    Breasts: No masses.  No nipple discharge.  No redness    Objective   /65   Pulse 61   Ht 1.676 m (5' 6\")   Wt 106 kg (233 lb)   LMP 12/10/2021   BMI 37.61 kg/m²     OBGyn Exam   Physical Exam  Constitutional: Alert and in no acute distress. Well developed, well nourished.   Head and Face: Head and face: Normal.    Eyes: Normal external exam - nonicteric sclera, extraocular movements intact (EOMI) and no ptosis.   Ears, Nose, Mouth, and Throat: External inspection of ears and nose: Normal.    Neck: No neck asymmetry. Supple. Thyroid not enlarged and there were no palpable thyroid nodules.   Chest: Breasts: Normal appearance, no nipple discharge and no skin changes. Palpation of breasts and axillae: No palpable mass and no axillary lymphadenopathy.   Abdomen: Soft nontender; no abdominal mass palpated. No organomegaly. No hernias.     Genitourinary:   External genitalia: " Normal. No inguinal lymphadenopathy. Bartholin's Urethral and Skenes Glands: Normal. Urethra: Normal.    Bladder: Normal on palpation.   Vagina: Normal.   Cervix: Normal.    Uterus: Normal.    Right Adnexa/parametria: Normal.  Left Adnexa/parametria: Normal.    Inspection of Perianal Area: Normal.     Musculoskeletal: No joint swelling seen, normal movements of all extremities.   Skin: Normal skin color and pigmentation, normal skin turgor, and no rash.   Neurologic: Non-focal. Grossly intact.   Psychiatric: Alert and oriented x 3. Affect normal to patient baseline. Mood: Appropriate.      Assessment/Plan   1) Annual exam:  Normal exam today  Mammogram order placed    Thank you again for your visit to our office today  Please follow-up as needed or in 1 year with your annual exam

## 2025-06-14 PROCEDURE — RXMED WILLOW AMBULATORY MEDICATION CHARGE

## 2025-06-16 DIAGNOSIS — I42.9 CARDIOMYOPATHY, UNSPECIFIED TYPE (MULTI): ICD-10-CM

## 2025-06-16 DIAGNOSIS — I50.22 CHRONIC SYSTOLIC CONGESTIVE HEART FAILURE: ICD-10-CM

## 2025-06-16 DIAGNOSIS — I25.10 CORONARY ARTERY DISEASE INVOLVING NATIVE CORONARY ARTERY OF NATIVE HEART WITHOUT ANGINA PECTORIS: ICD-10-CM

## 2025-06-16 DIAGNOSIS — E78.5 DYSLIPIDEMIA: ICD-10-CM

## 2025-06-17 ENCOUNTER — PHARMACY VISIT (OUTPATIENT)
Dept: PHARMACY | Facility: CLINIC | Age: 70
End: 2025-06-17
Payer: COMMERCIAL

## 2025-06-23 ENCOUNTER — HOSPITAL ENCOUNTER (OUTPATIENT)
Dept: CARDIOLOGY | Facility: CLINIC | Age: 70
Discharge: HOME | End: 2025-06-23
Payer: MEDICARE

## 2025-06-23 DIAGNOSIS — I47.29 OTHER VENTRICULAR TACHYCARDIA: ICD-10-CM

## 2025-06-23 DIAGNOSIS — Z95.810 PRESENCE OF AUTOMATIC (IMPLANTABLE) CARDIAC DEFIBRILLATOR: ICD-10-CM

## 2025-06-23 PROCEDURE — 93296 REM INTERROG EVL PM/IDS: CPT

## 2025-06-26 NOTE — PROGRESS NOTES
"  Pharmacist Clinic: Cardiology Management    Ana Cavanaugh is a 69 y.o. female was referred to Clinical Pharmacy Team for weight loss management.     Referring Provider: Gini Bowers APRN*    THIS IS A FOLLOW UP PATIENT APPOINTMENT. AT LAST VISIT ON 5/30/25 WITH PHARMACIST (Rosalie Rob).    Appointment was completed by Ana who was reached at primary number.    REVIEW OF PAST APPNT (IF APPLICABLE):   Patient received Wegovy and is agreeable to monthly copay.   She has not started medication as she was waiting for us to speak.   Discussed dosing/administration/ADRs with patient regarding Wegovy.   Patient is now going to start medication and will call [facility] with any questions/concerns.      Allergies Reviewed? No    Allergies[1]    Medical History[2]    Medications Ordered Prior to Encounter[3]      RELEVANT LAB RESULTS:  Lab Results   Component Value Date    BILITOT 0.5 08/06/2024    CALCIUM 9.8 12/26/2024    CO2 29 12/26/2024     12/26/2024    CREATININE 0.92 12/26/2024    GLUCOSE 98 12/26/2024    ALKPHOS 76 08/06/2024    K 4.6 12/26/2024    PROT 7.2 08/06/2024     12/26/2024    AST 30 08/06/2024    ALT 26 08/06/2024    BUN 20 12/26/2024    ANIONGAP 11 12/26/2024    MG 2.0 08/02/2023    PHOS 2.8 09/27/2022     (H) 08/06/2021    ALBUMIN 4.1 08/06/2024    LIPASE 33 05/29/2021    GFRF CANCELED 08/17/2023    GFRMALE CANCELED 08/17/2023     Lab Results   Component Value Date    TRIG 79 08/06/2024    CHOL 114 08/06/2024    LDLCALC 53 08/06/2024    HDL 45.1 08/06/2024     No results found for: \"BMCBC\", \"CBCDIF\"     PHARMACEUTICAL ASSESSMENT:    MEDICATION RECONCILIATION    Was a medication reconciliation completed at this visit? No  Home Pharmacy Reviewed? Yes, describe: [facility]     Drug Interactions? No    Medication Documentation Review Audit       Reviewed by Jean Pierre Jauregui MD (Physician) on 06/02/25 at 0904      Medication Order Taking? Sig Documenting Provider Last Dose Status "   acetaminophen (Tylenol 8 HOUR) 650 mg ER tablet [id number] Yes Take 2 tablets (1,300 mg) by mouth every 12 hours. Do not crush, chew, or split. Historical Provider, MD  Active   aspirin 81 mg EC tablet [id number] Yes Take 1 tablet (81 mg) by mouth once daily. ZIYAD Rapp  Active   atorvastatin (Lipitor) 80 mg tablet [id number] Yes Take 1 tablet (80 mg) by mouth once daily at bedtime. ZIYAD Rapp  Active     Discontinued 06/02/25 0846   dapagliflozin propanediol (Farxiga) 10 mg tablet [id number] Yes Take 1 tablet (10 mg) by mouth once daily. Jose Goodwin MD  Active   furosemide (Lasix) 20 mg tablet [id number] Yes Take 1 tablet (20 mg) by mouth once daily. ZIYAD Rapp  Active   gabapentin (Neurontin) 100 mg capsule [id number] Yes Take 1 capsule (100 mg) by mouth 3 times a day. Harsha Chu MD  Active   Klor-Con M10 10 mEq ER tablet [id number]  TAKE 1 TABLET BY MOUTH EVERY DAY Jose Goodwin MD  Flag for Review   metoprolol succinate XL (Toprol-XL) 100 mg 24 hr tablet [id number] Yes TAKE 1 TABLET (100 MG) IN AM AND 1/2 TABLET (50 MG) IN PM ZIYAD Rapp  Active   multivitamin-Ca-iron-minerals (Tab-A-Michelle Womens) 27-0.4 mg tablet [id number] Yes Take 1 tablet by mouth once daily. Historical Provider, MD  Active   omeprazole (PriLOSEC) 20 mg DR capsule [id number] Yes Take 1 capsule (20 mg) by mouth once daily. Jose Goodwin MD  Active   sacubitriL-valsartan (Entresto)  mg tablet [id number] Yes Take 1 tablet by mouth 2 times a day. Jose Goodwin MD  Active   semaglutide, weight loss, (Wegovy) 0.25 mg/0.5 mL pen injector [id number] Yes Inject 0.25 mg under the skin 1 (one) time per week. ZIYAD Rapp  Active   spironolactone (Aldactone) 25 mg tablet [id number] Yes Take 1 tablet (25 mg) by mouth once daily. Gini Bowers, APRN-CNP  Active                    DISEASE MANAGEMENT ASSESSMENT:     WEIGHT LOSS ASSESSMENT     Wt Readings from Last 3  Encounters:   06/02/25 106 kg (233 lb)   03/14/25 104 kg (230 lb)   02/13/25 104 kg (229 lb)     BMI Readings from Last 3 Encounters:   06/02/25 37.61 kg/m²   03/14/25 37.12 kg/m²   02/13/25 36.96 kg/m²       Recorded Home Weight(s): 242lb 5/23/25 and now 224lb today   1-2 meals   Breakfast: coffee, sandwich or bread/muffin  Dinner: salmon/chicken, rice/potatoes, greens  Snacks: peaches, a lot fruit, chips/popcorn   No pop   Stress eater   Exercise Routine: 2 days per week goes to an exercise class, 2 times a week on treadmill     CURRENT PHARMACOTHERAPY   Wegovy 0.25mg subcutaneous weekly    Affordability/Accessibility: $58/month   Adherence/Organization: reports adherence   Adverse Reactions: no side effects reports has been staying very hydrated     Relevant PMH:  PMH of Pancreatitis? No  PMH of Retinopathy? No  PMH of MTC? No    PATIENT EDUCATION/GOALS  Wegovy must be refrigerated. If necessary, the pen may be kept at room temperature for up to 28 days. Each box comes with 4 pens, one for each week.     1.  Remove the pen from the refrigerator. Keep the pen cap on until you are ready to inject.   2.  Check the pen label to make sure that it is the correct medication and dose. Also check to make sure that the solution is not cloudy, discolored or have particles in it.   3. Prior to administration wash your hands.   4. Choose your injection site either your abdomen or thigh (if someone else is giving the injection the upper arm can also be used).   5. Ensure to change (rotate) injection sites each week. You can use the same area of the body but inject in a different part of that area.   6. Once the injection site has been selected use an alcohol swab to clean off the area.   7. Remove the grey cap and press firmly onto the injection site.   8. Push the pen against the skin until the yellow bar has stopped moving. You will hear two clicks; one indicates that the injection has started, and the other indicates an  ongoing injection. The injection process will take about 10 seconds.  9. Do not rub the area after administration   10. Remove pen and discard in a sharps container (such as a milk jug, detergent bottle, or coffee canister)   11. Injections should be done on the same day each week, if you forget you have up to 5 days to take your dose.    -  Counseled patient on Wegovy MOA, expectations, side effects, duration of therapy, administration, and monitoring parameters.  - Advised patient that they may experience improved satiety after meals and portion sizes of meals may be reduced as doses of Wegovy increase.  - Counseled patient to avoid foods that are fatty/oily as this may precipitate the nausea/GI upset that may occur with new start Wegovy.    COUNSELING:   - Counseled patient on MOA, expectations, side effects, duration of therapy, contraindications, administration, and monitoring parameters  - Answered all patient questions and concerns; provided [facility] phone number if issues/questions arise       DISCUSSION/NOTES:   Patient is very happy with the weight loss she has had with wegovy 0.25mg subcutaneous q7d. She is down about 20lbs with wegovy + exercise.   She just bought another box of Wegovy 0.25mg therefore will have her finish this box and will start 0.5mg subcutaneous q7d after she completes another month of 0.25mg.   Patient understands this plan.  Rx for 0.5mg sent to [facility].  Will follow up in 6 weeks.     ASSESSMENT:    Assessment/Plan   Problem List Items Addressed This Visit       Myocardial infarction in recovery phase (Multi) - Primary    Start Wegovy 0.5mg subcutaneous weekly for cardiac protection.  Monitor weight and for GI side effects.          Obesity    Relevant Medications    semaglutide, weight loss, (Wegovy) 0.5 mg/0.5 mL pen injector    Other Relevant Orders    Referral to Clinical Pharmacy         RECOMMENDATIONS/PLAN:    STOP  Wegovy 0.25mg   Start  Wegovy 0.5mg subcutaneous q7d   Follow up  in 6 weeks.     Last Appnt with Referring Provider: 2/13/25  Next Appnt with Referring Provider: 8/19/25  Clinical Pharmacist follow up: 8/8/25  Type of Encounter: Alma Rob PharmD    Verbal consent to manage patient's drug therapy was obtained from the patient . They were informed they may decline to participate or withdraw from participation in pharmacy services at any time.    Continue all meds under the continuation of care with the referring provider and clinical pharmacy team.           [1]   Allergies  Allergen Reactions    Hay Fever And Allergy Relief Itching and Runny nose   [2] No past medical history on file.  [3]   Current Outpatient Medications on File Prior to Visit   Medication Sig Dispense Refill    acetaminophen (Tylenol 8 HOUR) 650 mg ER tablet Take 2 tablets (1,300 mg) by mouth every 12 hours. Do not crush, chew, or split.      aspirin 81 mg EC tablet Take 1 tablet (81 mg) by mouth once daily. 90 tablet 3    atorvastatin (Lipitor) 80 mg tablet Take 1 tablet (80 mg) by mouth once daily at bedtime. 90 tablet 3    dapagliflozin propanediol (Farxiga) 10 mg tablet Take 1 tablet (10 mg) by mouth once daily. 90 tablet 3    furosemide (Lasix) 20 mg tablet Take 1 tablet (20 mg) by mouth once daily. 90 tablet 3    gabapentin (Neurontin) 100 mg capsule Take 1 capsule (100 mg) by mouth 3 times a day. 270 capsule 1    Klor-Con M10 10 mEq ER tablet TAKE 1 TABLET BY MOUTH EVERY DAY 90 tablet 3    metoprolol succinate XL (Toprol-XL) 100 mg 24 hr tablet TAKE 1 TABLET (100 MG) IN AM AND 1/2 TABLET (50 MG) IN  tablet 3    multivitamin-Ca-iron-minerals (Tab-A-Michelle Womens) 27-0.4 mg tablet Take 1 tablet by mouth once daily.      omeprazole (PriLOSEC) 20 mg DR capsule Take 1 capsule (20 mg) by mouth once daily. 90 capsule 3    sacubitriL-valsartan (Entresto)  mg tablet Take 1 tablet by mouth 2 times a day. 180 tablet 3    spironolactone (Aldactone) 25 mg tablet Take 1 tablet (25 mg)  by mouth once daily. 90 tablet 3    [DISCONTINUED] semaglutide, weight loss, (Wegovy) 0.25 mg/0.5 mL pen injector Inject 0.25 mg under the skin 1 (one) time per week. 2 mL 1     No current facility-administered medications on file prior to visit.

## 2025-06-27 ENCOUNTER — APPOINTMENT (OUTPATIENT)
Dept: PHARMACY | Facility: HOSPITAL | Age: 70
End: 2025-06-27
Payer: MEDICARE

## 2025-06-27 DIAGNOSIS — I21.9 MYOCARDIAL INFARCTION IN RECOVERY PHASE (MULTI): Primary | ICD-10-CM

## 2025-06-27 DIAGNOSIS — E66.812 CLASS 2 SEVERE OBESITY WITH SERIOUS COMORBIDITY AND BODY MASS INDEX (BMI) OF 37.0 TO 37.9 IN ADULT, UNSPECIFIED OBESITY TYPE: ICD-10-CM

## 2025-06-27 DIAGNOSIS — E66.01 CLASS 2 SEVERE OBESITY WITH SERIOUS COMORBIDITY AND BODY MASS INDEX (BMI) OF 37.0 TO 37.9 IN ADULT, UNSPECIFIED OBESITY TYPE: ICD-10-CM

## 2025-06-27 PROCEDURE — RXMED WILLOW AMBULATORY MEDICATION CHARGE

## 2025-06-27 RX ORDER — SEMAGLUTIDE 0.5 MG/.5ML
0.5 INJECTION, SOLUTION SUBCUTANEOUS
Qty: 2 ML | Refills: 1 | Status: SHIPPED | OUTPATIENT
Start: 2025-06-27

## 2025-06-27 NOTE — ASSESSMENT & PLAN NOTE
Start Wegovy 0.5mg subcutaneous weekly for cardiac protection.  Monitor weight and for GI side effects.

## 2025-06-27 NOTE — Clinical Note
Hello, · Patient is very happy with the weight loss she has had with wegovy 0.25mg subcutaneous q7d. She is down about 20lbs with wegovy + exercise.  · She just bought another box of Wegovy 0.25mg therefore will have her finish this box and will start 0.5mg subcutaneous q7d after she completes another month of 0.25mg.   o Patient understands this plan. · Rx for 0.5mg sent to [facility]. · Will follow up in 6 weeks.

## 2025-06-28 ENCOUNTER — PHARMACY VISIT (OUTPATIENT)
Dept: PHARMACY | Facility: CLINIC | Age: 70
End: 2025-06-28
Payer: COMMERCIAL

## 2025-06-30 DIAGNOSIS — I25.42 DISSECTION OF CORONARY ARTERY: ICD-10-CM

## 2025-07-07 RX ORDER — ASPIRIN 81 MG/1
81 TABLET ORAL DAILY
Qty: 90 TABLET | Refills: 3 | Status: SHIPPED | OUTPATIENT
Start: 2025-07-07

## 2025-07-27 PROCEDURE — RXMED WILLOW AMBULATORY MEDICATION CHARGE

## 2025-07-29 ENCOUNTER — PHARMACY VISIT (OUTPATIENT)
Dept: PHARMACY | Facility: CLINIC | Age: 70
End: 2025-07-29
Payer: COMMERCIAL

## 2025-08-01 NOTE — ASSESSMENT & PLAN NOTE
Patient is on the Wegovy for cardiovascular benefit and has reported no side effects with the medication  STOP  Wegovy 0.5 mg subcutaneous once weekly  Start  Wegovy 1 mg subcutaneous once weekly  Follow up in 1 month   Monitor:   Weight   GI side effects ( nausea, vomiting, diarrhea, constipation)

## 2025-08-01 NOTE — PROGRESS NOTES
"  Pharmacist Clinic: Cardiology Management    Ana Cavanaugh is a 69 y.o. female was referred to Clinical Pharmacy Team for weight loss management.     Referring Provider: Gini Bowers APRN*    THIS IS A FOLLOW UP PATIENT APPOINTMENT. AT LAST VISIT ON 06/27/25 WITH PHARMACIST (Rosalie Rob).    Appointment was completed by Ana who was reached at primary number.    REVIEW OF PAST APPNT (IF APPLICABLE):   Patient was increased to Wegovy 0.5 mg at last appointment.  Patient did not have any side effects to the Wegovy at last appointment.  Patient stated if she has any concerns she will reach out to clinical pharmacist.    Allergies Reviewed? No    Allergies[1]    Medical History[2]    Medications Ordered Prior to Encounter[3]      RELEVANT LAB RESULTS:  Lab Results   Component Value Date    BILITOT 0.5 08/06/2024    CALCIUM 9.8 12/26/2024    CO2 29 12/26/2024     12/26/2024    CREATININE 0.92 12/26/2024    GLUCOSE 98 12/26/2024    ALKPHOS 76 08/06/2024    K 4.6 12/26/2024    PROT 7.2 08/06/2024     12/26/2024    AST 30 08/06/2024    ALT 26 08/06/2024    BUN 20 12/26/2024    ANIONGAP 11 12/26/2024    MG 2.0 08/02/2023    PHOS 2.8 09/27/2022     (H) 08/06/2021    ALBUMIN 4.1 08/06/2024    LIPASE 33 05/29/2021    GFRF CANCELED 08/17/2023    GFRMALE CANCELED 08/17/2023     Lab Results   Component Value Date    TRIG 79 08/06/2024    CHOL 114 08/06/2024    LDLCALC 53 08/06/2024    HDL 45.1 08/06/2024     No results found for: \"BMCBC\", \"CBCDIF\"     PHARMACEUTICAL ASSESSMENT:    MEDICATION RECONCILIATION    Was a medication reconciliation completed at this visit? No  Home Pharmacy Reviewed? Yes, describe: [facility]     Drug Interactions? No    Medication Documentation Review Audit       Reviewed by Jean Pierre Jauregui MD (Physician) on 06/02/25 at 0904      Medication Order Taking? Sig Documenting Provider Last Dose Status   acetaminophen (Tylenol 8 HOUR) 650 mg ER tablet [id number] Yes Take 2 " tablets (1,300 mg) by mouth every 12 hours. Do not crush, chew, or split. Historical Provider, MD  Active   aspirin 81 mg EC tablet [id number] Yes Take 1 tablet (81 mg) by mouth once daily. ZIYAD Rapp  Active   atorvastatin (Lipitor) 80 mg tablet [id number] Yes Take 1 tablet (80 mg) by mouth once daily at bedtime. ZIYAD Rapp  Active     Discontinued 06/02/25 0846   dapagliflozin propanediol (Farxiga) 10 mg tablet [id number] Yes Take 1 tablet (10 mg) by mouth once daily. Jose Goodwin MD  Active   furosemide (Lasix) 20 mg tablet [id number] Yes Take 1 tablet (20 mg) by mouth once daily. ZIYAD Rapp  Active   gabapentin (Neurontin) 100 mg capsule [id number] Yes Take 1 capsule (100 mg) by mouth 3 times a day. Harsha Chu MD  Active   Klor-Con M10 10 mEq ER tablet [id number]  TAKE 1 TABLET BY MOUTH EVERY DAY Jose Goodwin MD  Flag for Review   metoprolol succinate XL (Toprol-XL) 100 mg 24 hr tablet [id number] Yes TAKE 1 TABLET (100 MG) IN AM AND 1/2 TABLET (50 MG) IN PM ZIYAD Rapp  Active   multivitamin-Ca-iron-minerals (Tab-A-Michelle Womens) 27-0.4 mg tablet [id number] Yes Take 1 tablet by mouth once daily. Historical Provider, MD  Active   omeprazole (PriLOSEC) 20 mg DR capsule [id number] Yes Take 1 capsule (20 mg) by mouth once daily. Jose Goodwin MD  Active   sacubitriL-valsartan (Entresto)  mg tablet [id number] Yes Take 1 tablet by mouth 2 times a day. Jose Goodwin MD  Active   semaglutide, weight loss, (Wegovy) 0.25 mg/0.5 mL pen injector [id number] Yes Inject 0.25 mg under the skin 1 (one) time per week. ZIYAD Rapp  Active   spironolactone (Aldactone) 25 mg tablet [id number] Yes Take 1 tablet (25 mg) by mouth once daily. Gini Bowers, APRN-CNP  Active                    DISEASE MANAGEMENT ASSESSMENT:     WEIGHT LOSS ASSESSMENT     Wt Readings from Last 3 Encounters:   06/02/25 106 kg (233 lb)   03/14/25 104 kg (230 lb)    02/13/25 104 kg (229 lb)     BMI Readings from Last 3 Encounters:   06/02/25 37.61 kg/m²   03/14/25 37.12 kg/m²   02/13/25 36.96 kg/m²       Recorded Home Weight(s): 242lb 5/23/25 and now 222 lb today   Diet: 1-2 meals   Breakfast: coffee, sandwich or bread/muffin  Dinner: salmon/chicken, rice/potatoes, greens  Snacks: peaches, a lot fruit, chips/popcorn  Does not drink any soda  Stress eater   Exercise Routine: 2 days per week goes to an exercise class, 2 times a week on treadmill     CURRENT PHARMACOTHERAPY   Wegovy 0.5 mg subcutaneous weekly    Affordability/Accessibility: $0/month  Adherence/Organization: reports adherence   Adverse Reactions: no side effects reports has been staying very hydrated     Relevant PMH:  PMH of Pancreatitis? No  PMH of Retinopathy? No  PMH of MTC? No     DISCUSSION/NOTES:   Patient stated that she has had some weight loss since starting the Wegovy, but this past month she has only lost a couple lbs from 224 lbs to about 222 lbs.  Patient reports no side effects to the medication and would like to increase the dose of the Wegovy  Prescription for Wegovy 1 mg sent to [facility]  Follow up in 1 month    ASSESSMENT:    Assessment/Plan   Problem List Items Addressed This Visit       Myocardial infarction in recovery phase (Multi) - Primary    Patient is on the Wegovy for cardiovascular benefit and has reported no side effects with the medication  STOP  Wegovy 0.5 mg subcutaneous once weekly  Start  Wegovy 1 mg subcutaneous once weekly  Follow up in 1 month   Monitor:   Weight   GI side effects ( nausea, vomiting, diarrhea, constipation)         Relevant Orders    Referral to Clinical Pharmacy       RECOMMENDATIONS/PLAN:  STOP  Wegovy 0.5 mg subcutaneous once weekly  Start  Wegovy 1 mg subcutaneous once weekly  Follow up in 1 month     Last Appnt with Referring Provider: 2/13/25  Next Appnt with Referring Provider: 8/19/25  Clinical Pharmacist follow up: 09/12/2025  Type of  Encounter: Alma    Peg,  Angeline Loya, PharmD  PGY-1 Pharmacy Resident  [phone]     Verbal consent to manage patient's drug therapy was obtained from the patient . They were informed they may decline to participate or withdraw from participation in pharmacy services at any time.    Continue all meds under the continuation of care with the referring provider and clinical pharmacy team.           [1]   Allergies  Allergen Reactions    Hay Fever And Allergy Relief Itching and Runny nose   [2] No past medical history on file.  [3]   Current Outpatient Medications on File Prior to Visit   Medication Sig Dispense Refill    acetaminophen (Tylenol 8 HOUR) 650 mg ER tablet Take 2 tablets (1,300 mg) by mouth every 12 hours. Do not crush, chew, or split.      aspirin 81 mg EC tablet TAKE 1 TABLET BY MOUTH ONCE DAILY. 90 tablet 3    atorvastatin (Lipitor) 80 mg tablet Take 1 tablet (80 mg) by mouth once daily at bedtime. 90 tablet 3    dapagliflozin propanediol (Farxiga) 10 mg tablet Take 1 tablet (10 mg) by mouth once daily. 90 tablet 3    furosemide (Lasix) 20 mg tablet Take 1 tablet (20 mg) by mouth once daily. 90 tablet 3    gabapentin (Neurontin) 100 mg capsule Take 1 capsule (100 mg) by mouth 3 times a day. 270 capsule 1    Klor-Con M10 10 mEq ER tablet TAKE 1 TABLET BY MOUTH EVERY DAY 90 tablet 3    metoprolol succinate XL (Toprol-XL) 100 mg 24 hr tablet TAKE 1 TABLET (100 MG) IN AM AND 1/2 TABLET (50 MG) IN  tablet 3    multivitamin-Ca-iron-minerals (Tab-A-Michelle Womens) 27-0.4 mg tablet Take 1 tablet by mouth once daily.      omeprazole (PriLOSEC) 20 mg DR capsule Take 1 capsule (20 mg) by mouth once daily. 90 capsule 3    sacubitriL-valsartan (Entresto)  mg tablet Take 1 tablet by mouth 2 times a day. 180 tablet 3    semaglutide, weight loss, (Wegovy) 0.5 mg/0.5 mL pen injector Inject 0.5 mg under the skin every 7 days. 2 mL 1    spironolactone (Aldactone) 25 mg tablet Take 1 tablet (25 mg) by  mouth once daily. 90 tablet 3     No current facility-administered medications on file prior to visit.

## 2025-08-08 ENCOUNTER — APPOINTMENT (OUTPATIENT)
Dept: PHARMACY | Facility: HOSPITAL | Age: 70
End: 2025-08-08
Payer: COMMERCIAL

## 2025-08-08 DIAGNOSIS — I21.9 MYOCARDIAL INFARCTION IN RECOVERY PHASE (MULTI): Primary | ICD-10-CM

## 2025-08-08 DIAGNOSIS — E66.01 CLASS 2 SEVERE OBESITY WITH SERIOUS COMORBIDITY AND BODY MASS INDEX (BMI) OF 37.0 TO 37.9 IN ADULT, UNSPECIFIED OBESITY TYPE: ICD-10-CM

## 2025-08-08 DIAGNOSIS — E66.812 CLASS 2 SEVERE OBESITY WITH SERIOUS COMORBIDITY AND BODY MASS INDEX (BMI) OF 37.0 TO 37.9 IN ADULT, UNSPECIFIED OBESITY TYPE: ICD-10-CM

## 2025-08-08 PROCEDURE — RXMED WILLOW AMBULATORY MEDICATION CHARGE

## 2025-08-08 RX ORDER — SEMAGLUTIDE 1 MG/.5ML
1 INJECTION, SOLUTION SUBCUTANEOUS WEEKLY
Qty: 2 ML | Refills: 1 | Status: SHIPPED | OUTPATIENT
Start: 2025-08-08

## 2025-08-08 NOTE — PROGRESS NOTES
I have reviewed the progress note and agree with the residents findings and plan as written. Case discussed with resident.     Rosalie Rob, PharmD, BCCP

## 2025-08-12 ENCOUNTER — PHARMACY VISIT (OUTPATIENT)
Dept: PHARMACY | Facility: CLINIC | Age: 70
End: 2025-08-12
Payer: COMMERCIAL

## 2025-08-19 ENCOUNTER — OFFICE VISIT (OUTPATIENT)
Dept: CARDIOLOGY | Facility: HOSPITAL | Age: 70
End: 2025-08-19
Payer: MEDICARE

## 2025-08-19 VITALS
HEART RATE: 73 BPM | SYSTOLIC BLOOD PRESSURE: 87 MMHG | OXYGEN SATURATION: 98 % | HEIGHT: 66 IN | DIASTOLIC BLOOD PRESSURE: 59 MMHG | WEIGHT: 214 LBS | BODY MASS INDEX: 34.39 KG/M2

## 2025-08-19 DIAGNOSIS — I10 PRIMARY HYPERTENSION: Primary | ICD-10-CM

## 2025-08-19 DIAGNOSIS — I50.9 HEART FAILURE, UNSPECIFIED: ICD-10-CM

## 2025-08-19 DIAGNOSIS — I42.9 CARDIOMYOPATHY, UNSPECIFIED TYPE (MULTI): ICD-10-CM

## 2025-08-19 DIAGNOSIS — I50.22 CHRONIC SYSTOLIC CONGESTIVE HEART FAILURE: ICD-10-CM

## 2025-08-19 DIAGNOSIS — I25.10 CORONARY ARTERY DISEASE INVOLVING NATIVE CORONARY ARTERY OF NATIVE HEART WITHOUT ANGINA PECTORIS: ICD-10-CM

## 2025-08-19 DIAGNOSIS — Z98.890 H/O MITRAL VALVE REPAIR: ICD-10-CM

## 2025-08-19 LAB
ATRIAL RATE: 73 BPM
P AXIS: 63 DEGREES
P OFFSET: 201 MS
P ONSET: 139 MS
PR INTERVAL: 170 MS
Q ONSET: 224 MS
QRS COUNT: 12 BEATS
QRS DURATION: 92 MS
QT INTERVAL: 400 MS
QTC CALCULATION(BAZETT): 440 MS
QTC FREDERICIA: 427 MS
R AXIS: 18 DEGREES
T AXIS: 109 DEGREES
T OFFSET: 424 MS
VENTRICULAR RATE: 73 BPM

## 2025-08-19 PROCEDURE — 99214 OFFICE O/P EST MOD 30 MIN: CPT | Performed by: NURSE PRACTITIONER

## 2025-08-19 PROCEDURE — 99213 OFFICE O/P EST LOW 20 MIN: CPT

## 2025-08-19 PROCEDURE — 3008F BODY MASS INDEX DOCD: CPT | Performed by: NURSE PRACTITIONER

## 2025-08-19 PROCEDURE — 3074F SYST BP LT 130 MM HG: CPT | Performed by: NURSE PRACTITIONER

## 2025-08-19 PROCEDURE — 93005 ELECTROCARDIOGRAM TRACING: CPT | Performed by: NURSE PRACTITIONER

## 2025-08-19 PROCEDURE — 1159F MED LIST DOCD IN RCRD: CPT | Performed by: NURSE PRACTITIONER

## 2025-08-19 PROCEDURE — 3078F DIAST BP <80 MM HG: CPT | Performed by: NURSE PRACTITIONER

## 2025-08-19 RX ORDER — SPIRONOLACTONE 25 MG/1
12.5 TABLET ORAL DAILY
Start: 2025-08-19 | End: 2026-08-19

## 2025-08-19 RX ORDER — FUROSEMIDE 20 MG/1
20 TABLET ORAL AS NEEDED
Start: 2025-08-19 | End: 2026-08-19

## 2025-08-20 LAB
ALBUMIN SERPL-MCNC: 4.3 G/DL (ref 3.6–5.1)
ALP SERPL-CCNC: 55 U/L (ref 37–153)
ALT SERPL-CCNC: 18 U/L (ref 6–29)
ANION GAP SERPL CALCULATED.4IONS-SCNC: 8 MMOL/L (CALC) (ref 7–17)
AST SERPL-CCNC: 21 U/L (ref 10–35)
BILIRUB SERPL-MCNC: 0.7 MG/DL (ref 0.2–1.2)
BUN SERPL-MCNC: 16 MG/DL (ref 7–25)
CALCIUM SERPL-MCNC: 9.5 MG/DL (ref 8.6–10.4)
CHLORIDE SERPL-SCNC: 101 MMOL/L (ref 98–110)
CHOLEST SERPL-MCNC: 118 MG/DL
CHOLEST/HDLC SERPL: 2.6 (CALC)
CO2 SERPL-SCNC: 29 MMOL/L (ref 20–32)
CREAT SERPL-MCNC: 1 MG/DL (ref 0.5–1.05)
EGFRCR SERPLBLD CKD-EPI 2021: 62 ML/MIN/1.73M2
ERYTHROCYTE [DISTWIDTH] IN BLOOD BY AUTOMATED COUNT: 13.9 % (ref 11–15)
GLUCOSE SERPL-MCNC: 88 MG/DL (ref 65–99)
HCT VFR BLD AUTO: 36.1 % (ref 35–45)
HDLC SERPL-MCNC: 46 MG/DL
HGB BLD-MCNC: 12 G/DL (ref 11.7–15.5)
LDLC SERPL CALC-MCNC: 55 MG/DL (CALC)
MCH RBC QN AUTO: 32 PG (ref 27–33)
MCHC RBC AUTO-ENTMCNC: 33.2 G/DL (ref 32–36)
MCV RBC AUTO: 96.3 FL (ref 80–100)
NONHDLC SERPL-MCNC: 72 MG/DL (CALC)
PLATELET # BLD AUTO: 192 THOUSAND/UL (ref 140–400)
PMV BLD REES-ECKER: 12.9 FL (ref 7.5–12.5)
POTASSIUM SERPL-SCNC: 4.4 MMOL/L (ref 3.5–5.3)
PROT SERPL-MCNC: 7.8 G/DL (ref 6.1–8.1)
RBC # BLD AUTO: 3.75 MILLION/UL (ref 3.8–5.1)
SODIUM SERPL-SCNC: 138 MMOL/L (ref 135–146)
TRIGL SERPL-MCNC: 87 MG/DL
WBC # BLD AUTO: 4.8 THOUSAND/UL (ref 3.8–10.8)

## 2025-09-12 ENCOUNTER — APPOINTMENT (OUTPATIENT)
Dept: PHARMACY | Facility: HOSPITAL | Age: 70
End: 2025-09-12
Payer: COMMERCIAL

## 2025-12-02 ENCOUNTER — APPOINTMENT (OUTPATIENT)
Dept: PRIMARY CARE | Facility: CLINIC | Age: 70
End: 2025-12-02
Payer: COMMERCIAL

## 2026-06-08 ENCOUNTER — APPOINTMENT (OUTPATIENT)
Dept: OBSTETRICS AND GYNECOLOGY | Facility: CLINIC | Age: 71
End: 2026-06-08
Payer: COMMERCIAL